# Patient Record
Sex: MALE
[De-identification: names, ages, dates, MRNs, and addresses within clinical notes are randomized per-mention and may not be internally consistent; named-entity substitution may affect disease eponyms.]

---

## 2017-01-05 ENCOUNTER — APPOINTMENT (OUTPATIENT)
Dept: GASTROENTEROLOGY | Facility: HOSPITAL | Age: 57
End: 2017-01-05

## 2017-02-14 ENCOUNTER — INPATIENT (INPATIENT)
Facility: HOSPITAL | Age: 57
LOS: 2 days | Discharge: HOME | End: 2017-02-17
Attending: HOSPITALIST | Admitting: HOSPITALIST
Payer: MEDICAID

## 2017-02-14 VITALS
DIASTOLIC BLOOD PRESSURE: 97 MMHG | HEART RATE: 124 BPM | TEMPERATURE: 99 F | SYSTOLIC BLOOD PRESSURE: 143 MMHG | WEIGHT: 164.91 LBS | OXYGEN SATURATION: 98 % | HEIGHT: 69 IN | RESPIRATION RATE: 16 BRPM

## 2017-02-14 DIAGNOSIS — Z98.89 OTHER SPECIFIED POSTPROCEDURAL STATES: Chronic | ICD-10-CM

## 2017-02-14 LAB
ALBUMIN SERPL ELPH-MCNC: 4.5 G/DL — SIGNIFICANT CHANGE UP (ref 3.3–5)
ALP SERPL-CCNC: 108 U/L — SIGNIFICANT CHANGE UP (ref 40–120)
ALT FLD-CCNC: 87 U/L — HIGH (ref 4–41)
AMYLASE P1 CFR SERPL: 133 U/L — HIGH (ref 25–125)
AST SERPL-CCNC: 84 U/L — HIGH (ref 4–40)
BASE EXCESS BLDV CALC-SCNC: 1.4 MMOL/L — SIGNIFICANT CHANGE UP
BASOPHILS # BLD AUTO: 0.03 K/UL — SIGNIFICANT CHANGE UP (ref 0–0.2)
BASOPHILS NFR BLD AUTO: 0.2 % — SIGNIFICANT CHANGE UP (ref 0–2)
BILIRUB SERPL-MCNC: 1.3 MG/DL — HIGH (ref 0.2–1.2)
BLOOD GAS VENOUS - CREATININE: 1.26 MG/DL — SIGNIFICANT CHANGE UP (ref 0.5–1.3)
BUN SERPL-MCNC: 14 MG/DL — SIGNIFICANT CHANGE UP (ref 7–23)
CALCIUM SERPL-MCNC: 9.7 MG/DL — SIGNIFICANT CHANGE UP (ref 8.4–10.5)
CHLORIDE BLDV-SCNC: 108 MMOL/L — SIGNIFICANT CHANGE UP (ref 96–108)
CHLORIDE SERPL-SCNC: 99 MMOL/L — SIGNIFICANT CHANGE UP (ref 98–107)
CO2 SERPL-SCNC: 21 MMOL/L — LOW (ref 22–31)
CREAT SERPL-MCNC: 1.23 MG/DL — SIGNIFICANT CHANGE UP (ref 0.5–1.3)
EOSINOPHIL # BLD AUTO: 0.15 K/UL — SIGNIFICANT CHANGE UP (ref 0–0.5)
EOSINOPHIL NFR BLD AUTO: 1 % — SIGNIFICANT CHANGE UP (ref 0–6)
GAS PNL BLDV: 136 MMOL/L — SIGNIFICANT CHANGE UP (ref 136–146)
GLUCOSE BLDV-MCNC: 108 — HIGH (ref 70–99)
GLUCOSE SERPL-MCNC: 110 MG/DL — HIGH (ref 70–99)
HCO3 BLDV-SCNC: 24 MMOL/L — SIGNIFICANT CHANGE UP (ref 20–27)
HCT VFR BLD CALC: 48.7 % — SIGNIFICANT CHANGE UP (ref 39–50)
HCT VFR BLDV CALC: 52.6 % — HIGH (ref 39–51)
HGB BLD-MCNC: 16.7 G/DL — SIGNIFICANT CHANGE UP (ref 13–17)
HGB BLDV-MCNC: 17.2 G/DL — HIGH (ref 13–17)
IMM GRANULOCYTES NFR BLD AUTO: 0.3 % — SIGNIFICANT CHANGE UP (ref 0–1.5)
LACTATE BLDV-MCNC: 2 MMOL/L — SIGNIFICANT CHANGE UP (ref 0.5–2)
LIDOCAIN IGE QN: 168.4 U/L — HIGH (ref 7–60)
LYMPHOCYTES # BLD AUTO: 1.25 K/UL — SIGNIFICANT CHANGE UP (ref 1–3.3)
LYMPHOCYTES # BLD AUTO: 8.7 % — LOW (ref 13–44)
MCHC RBC-ENTMCNC: 30.2 PG — SIGNIFICANT CHANGE UP (ref 27–34)
MCHC RBC-ENTMCNC: 34.3 % — SIGNIFICANT CHANGE UP (ref 32–36)
MCV RBC AUTO: 88.1 FL — SIGNIFICANT CHANGE UP (ref 80–100)
MONOCYTES # BLD AUTO: 1.12 K/UL — HIGH (ref 0–0.9)
MONOCYTES NFR BLD AUTO: 7.8 % — SIGNIFICANT CHANGE UP (ref 2–14)
NEUTROPHILS # BLD AUTO: 11.84 K/UL — HIGH (ref 1.8–7.4)
NEUTROPHILS NFR BLD AUTO: 82 % — HIGH (ref 43–77)
PCO2 BLDV: 50 MMHG — SIGNIFICANT CHANGE UP (ref 41–51)
PH BLDV: 7.34 PH — SIGNIFICANT CHANGE UP (ref 7.32–7.43)
PLATELET # BLD AUTO: 239 K/UL — SIGNIFICANT CHANGE UP (ref 150–400)
PMV BLD: 9.3 FL — SIGNIFICANT CHANGE UP (ref 7–13)
PO2 BLDV: 38 MMHG — SIGNIFICANT CHANGE UP (ref 35–40)
POTASSIUM BLDV-SCNC: 4.1 MMOL/L — SIGNIFICANT CHANGE UP (ref 3.4–4.5)
POTASSIUM SERPL-MCNC: 4.9 MMOL/L — SIGNIFICANT CHANGE UP (ref 3.5–5.3)
POTASSIUM SERPL-SCNC: 4.9 MMOL/L — SIGNIFICANT CHANGE UP (ref 3.5–5.3)
PROT SERPL-MCNC: 8 G/DL — SIGNIFICANT CHANGE UP (ref 6–8.3)
RBC # BLD: 5.53 M/UL — SIGNIFICANT CHANGE UP (ref 4.2–5.8)
RBC # FLD: 13.7 % — SIGNIFICANT CHANGE UP (ref 10.3–14.5)
SAO2 % BLDV: 66.6 % — SIGNIFICANT CHANGE UP (ref 60–85)
SODIUM SERPL-SCNC: 142 MMOL/L — SIGNIFICANT CHANGE UP (ref 135–145)
WBC # BLD: 14.44 K/UL — HIGH (ref 3.8–10.5)
WBC # FLD AUTO: 14.44 K/UL — HIGH (ref 3.8–10.5)

## 2017-02-14 PROCEDURE — 71275 CT ANGIOGRAPHY CHEST: CPT | Mod: 26

## 2017-02-14 PROCEDURE — 74177 CT ABD & PELVIS W/CONTRAST: CPT | Mod: 26

## 2017-02-14 RX ORDER — ONDANSETRON 8 MG/1
4 TABLET, FILM COATED ORAL ONCE
Qty: 0 | Refills: 0 | Status: COMPLETED | OUTPATIENT
Start: 2017-02-14 | End: 2017-02-14

## 2017-02-14 RX ORDER — SODIUM CHLORIDE 9 MG/ML
2000 INJECTION INTRAMUSCULAR; INTRAVENOUS; SUBCUTANEOUS ONCE
Qty: 0 | Refills: 0 | Status: COMPLETED | OUTPATIENT
Start: 2017-02-14 | End: 2017-02-14

## 2017-02-14 RX ORDER — HYDROMORPHONE HYDROCHLORIDE 2 MG/ML
1 INJECTION INTRAMUSCULAR; INTRAVENOUS; SUBCUTANEOUS ONCE
Qty: 0 | Refills: 0 | Status: DISCONTINUED | OUTPATIENT
Start: 2017-02-14 | End: 2017-02-14

## 2017-02-14 RX ADMIN — HYDROMORPHONE HYDROCHLORIDE 1 MILLIGRAM(S): 2 INJECTION INTRAMUSCULAR; INTRAVENOUS; SUBCUTANEOUS at 21:46

## 2017-02-14 RX ADMIN — ONDANSETRON 4 MILLIGRAM(S): 8 TABLET, FILM COATED ORAL at 21:46

## 2017-02-14 RX ADMIN — HYDROMORPHONE HYDROCHLORIDE 1 MILLIGRAM(S): 2 INJECTION INTRAMUSCULAR; INTRAVENOUS; SUBCUTANEOUS at 22:00

## 2017-02-14 RX ADMIN — SODIUM CHLORIDE 2000 MILLILITER(S): 9 INJECTION INTRAMUSCULAR; INTRAVENOUS; SUBCUTANEOUS at 21:47

## 2017-02-14 NOTE — ED ADULT NURSE NOTE - OBJECTIVE STATEMENT
received pt to room 28. pt is alert and oriented x3. c/o  generalized abdominal pain since this AM. took Motrin three times today with no relief. pt has PMH of acute pancreatitis and pancreatic surgery. vomited twice today, bilious vomitus .labs sent. 20 G SL placed  on right forearm. Md evaluation is in progress.

## 2017-02-14 NOTE — ED PROVIDER NOTE - OBJECTIVE STATEMENT
55 yo man with history of pancreatitis presenting with epigastric pain for 1 day.  Pain is sharp, burning, nonradiating, not better or worse with anything, severe in severity.  No fever, no vomit, last BM yesterday, no melena or hematochezia.  No chest pain or sob.

## 2017-02-14 NOTE — ED PROVIDER NOTE - PHYSICAL EXAMINATION
Coty att: General: Well appearing, nontoxic, no acute distress; Head: Normocephalic Atraumatic; Eyes: PERRL, EOMI; ENT: Airway patent; Neck: supple; Chest: Lungs clear to auscultation bilateral; Cardiac: Regular tachycardia, no murmurs, rubs or gallops; Abdomen: soft, epigastric tenderness, nondistended; no guarding or rebound; Musculoskeletal: Calves symmetric, nontender, no palpable cord; Skin: No rash, normal skin tone; Neuro: Alert and Oriented to person, place, and time; No focal deficit, CN 2-12 symmetric and intact

## 2017-02-14 NOTE — ED PROVIDER NOTE - PMH
Abuse, Drug or Alcohol    Pancreatitis    Pseudocyst, pancreas    Pulmonary embolism    Splenic vein thrombosis

## 2017-02-14 NOTE — ED PROVIDER NOTE - MEDICAL DECISION MAKING DETAILS
55 yo man presenting with history of PE, tachycardia and epigastric pain.  Given level of tachycardia, concern for hemorrhagic pancreatitis/necrotizing pancreatitis, PE, acute pancreatitis.  CT CAP to assess for both pending, labs, reevaluation.

## 2017-02-14 NOTE — ED PROVIDER NOTE - PROGRESS NOTE DETAILS
2016 US shows no gallstones, CBD wnl pt signed out pending results of the CT scan.  It showed pancreatitis.  Per sign out plan pt pain unable to be controlled and will be admitted for pain control.  Discussed with Dr Conti (hospitalist) and will admit to their service.  Pt's PMD is a resident at the resident medicine clinic.  ABDIAS Mcelroy paged with pt info.

## 2017-02-14 NOTE — ED ADULT TRIAGE NOTE - CHIEF COMPLAINT QUOTE
Pt comes in for generalized abd pain since this morning, pt reports that he took Motrin around 1630 and his pain is not controlled. Pt appears uncomfortable in triage, vs as noted.

## 2017-02-15 DIAGNOSIS — K85.90 ACUTE PANCREATITIS WITHOUT NECROSIS OR INFECTION, UNSPECIFIED: ICD-10-CM

## 2017-02-15 DIAGNOSIS — Z41.8 ENCOUNTER FOR OTHER PROCEDURES FOR PURPOSES OTHER THAN REMEDYING HEALTH STATE: ICD-10-CM

## 2017-02-15 DIAGNOSIS — R65.10 SYSTEMIC INFLAMMATORY RESPONSE SYNDROME (SIRS) OF NON-INFECTIOUS ORIGIN WITHOUT ACUTE ORGAN DYSFUNCTION: ICD-10-CM

## 2017-02-15 DIAGNOSIS — F19.10 OTHER PSYCHOACTIVE SUBSTANCE ABUSE, UNCOMPLICATED: ICD-10-CM

## 2017-02-15 DIAGNOSIS — R74.0 NONSPECIFIC ELEVATION OF LEVELS OF TRANSAMINASE AND LACTIC ACID DEHYDROGENASE [LDH]: ICD-10-CM

## 2017-02-15 LAB
ALBUMIN SERPL ELPH-MCNC: 4 G/DL — SIGNIFICANT CHANGE UP (ref 3.3–5)
ALP SERPL-CCNC: 93 U/L — SIGNIFICANT CHANGE UP (ref 40–120)
ALT FLD-CCNC: 64 U/L — HIGH (ref 4–41)
APPEARANCE UR: CLEAR — SIGNIFICANT CHANGE UP
AST SERPL-CCNC: 51 U/L — HIGH (ref 4–40)
BASOPHILS # BLD AUTO: 0.02 K/UL — SIGNIFICANT CHANGE UP (ref 0–0.2)
BASOPHILS NFR BLD AUTO: 0.2 % — SIGNIFICANT CHANGE UP (ref 0–2)
BILIRUB SERPL-MCNC: 1.4 MG/DL — HIGH (ref 0.2–1.2)
BILIRUB UR-MCNC: NEGATIVE — SIGNIFICANT CHANGE UP
BLOOD UR QL VISUAL: NEGATIVE — SIGNIFICANT CHANGE UP
BUN SERPL-MCNC: 11 MG/DL — SIGNIFICANT CHANGE UP (ref 7–23)
CALCIUM SERPL-MCNC: 9 MG/DL — SIGNIFICANT CHANGE UP (ref 8.4–10.5)
CHLORIDE SERPL-SCNC: 103 MMOL/L — SIGNIFICANT CHANGE UP (ref 98–107)
CO2 SERPL-SCNC: 22 MMOL/L — SIGNIFICANT CHANGE UP (ref 22–31)
COLOR SPEC: SIGNIFICANT CHANGE UP
CREAT SERPL-MCNC: 1 MG/DL — SIGNIFICANT CHANGE UP (ref 0.5–1.3)
EOSINOPHIL # BLD AUTO: 0.33 K/UL — SIGNIFICANT CHANGE UP (ref 0–0.5)
EOSINOPHIL NFR BLD AUTO: 3.5 % — SIGNIFICANT CHANGE UP (ref 0–6)
GLUCOSE SERPL-MCNC: 100 MG/DL — HIGH (ref 70–99)
GLUCOSE UR-MCNC: NEGATIVE — SIGNIFICANT CHANGE UP
HCT VFR BLD CALC: 43.2 % — SIGNIFICANT CHANGE UP (ref 39–50)
HGB BLD-MCNC: 14.5 G/DL — SIGNIFICANT CHANGE UP (ref 13–17)
IMM GRANULOCYTES NFR BLD AUTO: 0.3 % — SIGNIFICANT CHANGE UP (ref 0–1.5)
KETONES UR-MCNC: SIGNIFICANT CHANGE UP
LEUKOCYTE ESTERASE UR-ACNC: NEGATIVE — SIGNIFICANT CHANGE UP
LYMPHOCYTES # BLD AUTO: 1.47 K/UL — SIGNIFICANT CHANGE UP (ref 1–3.3)
LYMPHOCYTES # BLD AUTO: 15.7 % — SIGNIFICANT CHANGE UP (ref 13–44)
MAGNESIUM SERPL-MCNC: 2.1 MG/DL — SIGNIFICANT CHANGE UP (ref 1.6–2.6)
MCHC RBC-ENTMCNC: 30 PG — SIGNIFICANT CHANGE UP (ref 27–34)
MCHC RBC-ENTMCNC: 33.6 % — SIGNIFICANT CHANGE UP (ref 32–36)
MCV RBC AUTO: 89.3 FL — SIGNIFICANT CHANGE UP (ref 80–100)
MONOCYTES # BLD AUTO: 0.78 K/UL — SIGNIFICANT CHANGE UP (ref 0–0.9)
MONOCYTES NFR BLD AUTO: 8.3 % — SIGNIFICANT CHANGE UP (ref 2–14)
MUCOUS THREADS # UR AUTO: SIGNIFICANT CHANGE UP
NEUTROPHILS # BLD AUTO: 6.76 K/UL — SIGNIFICANT CHANGE UP (ref 1.8–7.4)
NEUTROPHILS NFR BLD AUTO: 72 % — SIGNIFICANT CHANGE UP (ref 43–77)
NITRITE UR-MCNC: NEGATIVE — SIGNIFICANT CHANGE UP
NON-SQ EPI CELLS # UR AUTO: SIGNIFICANT CHANGE UP
PH UR: 6 — SIGNIFICANT CHANGE UP (ref 4.6–8)
PHOSPHATE SERPL-MCNC: 3 MG/DL — SIGNIFICANT CHANGE UP (ref 2.5–4.5)
PLATELET # BLD AUTO: 209 K/UL — SIGNIFICANT CHANGE UP (ref 150–400)
PMV BLD: 9.5 FL — SIGNIFICANT CHANGE UP (ref 7–13)
POTASSIUM SERPL-MCNC: 3.6 MMOL/L — SIGNIFICANT CHANGE UP (ref 3.5–5.3)
POTASSIUM SERPL-SCNC: 3.6 MMOL/L — SIGNIFICANT CHANGE UP (ref 3.5–5.3)
PROT SERPL-MCNC: 6.7 G/DL — SIGNIFICANT CHANGE UP (ref 6–8.3)
PROT UR-MCNC: 20 — SIGNIFICANT CHANGE UP
RBC # BLD: 4.84 M/UL — SIGNIFICANT CHANGE UP (ref 4.2–5.8)
RBC # FLD: 13.7 % — SIGNIFICANT CHANGE UP (ref 10.3–14.5)
RBC CASTS # UR COMP ASSIST: SIGNIFICANT CHANGE UP (ref 0–?)
SODIUM SERPL-SCNC: 141 MMOL/L — SIGNIFICANT CHANGE UP (ref 135–145)
SP GR SPEC: 1.04 — HIGH (ref 1–1.03)
SQUAMOUS # UR AUTO: SIGNIFICANT CHANGE UP
UROBILINOGEN FLD QL: NORMAL E.U. — SIGNIFICANT CHANGE UP (ref 0.1–0.2)
WBC # BLD: 9.39 K/UL — SIGNIFICANT CHANGE UP (ref 3.8–10.5)
WBC # FLD AUTO: 9.39 K/UL — SIGNIFICANT CHANGE UP (ref 3.8–10.5)
WBC UR QL: SIGNIFICANT CHANGE UP (ref 0–?)

## 2017-02-15 PROCEDURE — 99254 IP/OBS CNSLTJ NEW/EST MOD 60: CPT | Mod: GC

## 2017-02-15 PROCEDURE — 99223 1ST HOSP IP/OBS HIGH 75: CPT

## 2017-02-15 RX ORDER — SODIUM CHLORIDE 9 MG/ML
1000 INJECTION INTRAMUSCULAR; INTRAVENOUS; SUBCUTANEOUS
Qty: 0 | Refills: 0 | Status: DISCONTINUED | OUTPATIENT
Start: 2017-02-15 | End: 2017-02-17

## 2017-02-15 RX ORDER — HYDROMORPHONE HYDROCHLORIDE 2 MG/ML
1 INJECTION INTRAMUSCULAR; INTRAVENOUS; SUBCUTANEOUS ONCE
Qty: 0 | Refills: 0 | Status: DISCONTINUED | OUTPATIENT
Start: 2017-02-15 | End: 2017-02-15

## 2017-02-15 RX ORDER — KETOROLAC TROMETHAMINE 30 MG/ML
30 SYRINGE (ML) INJECTION ONCE
Qty: 0 | Refills: 0 | Status: DISCONTINUED | OUTPATIENT
Start: 2017-02-15 | End: 2017-02-15

## 2017-02-15 RX ORDER — ENOXAPARIN SODIUM 100 MG/ML
40 INJECTION SUBCUTANEOUS EVERY 24 HOURS
Qty: 0 | Refills: 0 | Status: DISCONTINUED | OUTPATIENT
Start: 2017-02-15 | End: 2017-02-17

## 2017-02-15 RX ORDER — ONDANSETRON 8 MG/1
4 TABLET, FILM COATED ORAL EVERY 6 HOURS
Qty: 0 | Refills: 0 | Status: DISCONTINUED | OUTPATIENT
Start: 2017-02-15 | End: 2017-02-17

## 2017-02-15 RX ORDER — ATORVASTATIN CALCIUM 80 MG/1
10 TABLET, FILM COATED ORAL AT BEDTIME
Qty: 0 | Refills: 0 | Status: DISCONTINUED | OUTPATIENT
Start: 2017-02-15 | End: 2017-02-17

## 2017-02-15 RX ORDER — HYDROMORPHONE HYDROCHLORIDE 2 MG/ML
1 INJECTION INTRAMUSCULAR; INTRAVENOUS; SUBCUTANEOUS
Qty: 0 | Refills: 0 | Status: DISCONTINUED | OUTPATIENT
Start: 2017-02-15 | End: 2017-02-16

## 2017-02-15 RX ORDER — HYDROMORPHONE HYDROCHLORIDE 2 MG/ML
2 INJECTION INTRAMUSCULAR; INTRAVENOUS; SUBCUTANEOUS EVERY 4 HOURS
Qty: 0 | Refills: 0 | Status: DISCONTINUED | OUTPATIENT
Start: 2017-02-15 | End: 2017-02-16

## 2017-02-15 RX ADMIN — HYDROMORPHONE HYDROCHLORIDE 1 MILLIGRAM(S): 2 INJECTION INTRAMUSCULAR; INTRAVENOUS; SUBCUTANEOUS at 21:50

## 2017-02-15 RX ADMIN — HYDROMORPHONE HYDROCHLORIDE 1 MILLIGRAM(S): 2 INJECTION INTRAMUSCULAR; INTRAVENOUS; SUBCUTANEOUS at 03:14

## 2017-02-15 RX ADMIN — HYDROMORPHONE HYDROCHLORIDE 1 MILLIGRAM(S): 2 INJECTION INTRAMUSCULAR; INTRAVENOUS; SUBCUTANEOUS at 13:52

## 2017-02-15 RX ADMIN — HYDROMORPHONE HYDROCHLORIDE 1 MILLIGRAM(S): 2 INJECTION INTRAMUSCULAR; INTRAVENOUS; SUBCUTANEOUS at 08:49

## 2017-02-15 RX ADMIN — ATORVASTATIN CALCIUM 10 MILLIGRAM(S): 80 TABLET, FILM COATED ORAL at 22:58

## 2017-02-15 RX ADMIN — HYDROMORPHONE HYDROCHLORIDE 1 MILLIGRAM(S): 2 INJECTION INTRAMUSCULAR; INTRAVENOUS; SUBCUTANEOUS at 14:26

## 2017-02-15 RX ADMIN — HYDROMORPHONE HYDROCHLORIDE 1 MILLIGRAM(S): 2 INJECTION INTRAMUSCULAR; INTRAVENOUS; SUBCUTANEOUS at 00:54

## 2017-02-15 RX ADMIN — Medication 30 MILLIGRAM(S): at 03:23

## 2017-02-15 RX ADMIN — HYDROMORPHONE HYDROCHLORIDE 1 MILLIGRAM(S): 2 INJECTION INTRAMUSCULAR; INTRAVENOUS; SUBCUTANEOUS at 09:09

## 2017-02-15 RX ADMIN — ENOXAPARIN SODIUM 40 MILLIGRAM(S): 100 INJECTION SUBCUTANEOUS at 06:57

## 2017-02-15 RX ADMIN — HYDROMORPHONE HYDROCHLORIDE 1 MILLIGRAM(S): 2 INJECTION INTRAMUSCULAR; INTRAVENOUS; SUBCUTANEOUS at 22:58

## 2017-02-15 RX ADMIN — Medication 30 MILLIGRAM(S): at 02:53

## 2017-02-15 RX ADMIN — HYDROMORPHONE HYDROCHLORIDE 1 MILLIGRAM(S): 2 INJECTION INTRAMUSCULAR; INTRAVENOUS; SUBCUTANEOUS at 00:24

## 2017-02-15 RX ADMIN — HYDROMORPHONE HYDROCHLORIDE 1 MILLIGRAM(S): 2 INJECTION INTRAMUSCULAR; INTRAVENOUS; SUBCUTANEOUS at 02:44

## 2017-02-15 RX ADMIN — HYDROMORPHONE HYDROCHLORIDE 1 MILLIGRAM(S): 2 INJECTION INTRAMUSCULAR; INTRAVENOUS; SUBCUTANEOUS at 17:59

## 2017-02-15 RX ADMIN — HYDROMORPHONE HYDROCHLORIDE 1 MILLIGRAM(S): 2 INJECTION INTRAMUSCULAR; INTRAVENOUS; SUBCUTANEOUS at 18:26

## 2017-02-15 RX ADMIN — SODIUM CHLORIDE 100 MILLILITER(S): 9 INJECTION INTRAMUSCULAR; INTRAVENOUS; SUBCUTANEOUS at 05:26

## 2017-02-15 NOTE — H&P ADULT. - PROBLEM SELECTOR PLAN 1
Due to pancreatitis; Non toxic appearing; Low suspicion of infection;  -Monitor for fever  -Treat pancreatitis  -Monitor WBC  -BCx if spikes fever Due to pancreatitis; Non toxic appearing; Low suspicion of infection;  -Monitor for fever  -Treat pancreatitis  -Monitor WBC  -BCx if spikes fever  -check Lipid panel ([id number] in 12/2016)

## 2017-02-15 NOTE — H&P ADULT. - PROBLEM SELECTOR PLAN 3
Prior h/o of elevated bilirubin and AST/ALT;  Triggered MRCP in 6/19/2017 which was c/w pancreatitis and no choledocholithiasis  or  biliary  ductal  dilatation.  Monitor Prior h/o of elevated bilirubin and AST/ALT;  -Acute Hep panel negative in 12/2015 and 10/2015;  -Triggered MRCP in 6/19/2017 which was c/w pancreatitis and no evidence of choledocholithiasis  or  biliary  ductal  dilatation.  -Monitor

## 2017-02-15 NOTE — H&P ADULT. - HISTORY OF PRESENT ILLNESS
57 yo male with history of pancreatitis and pseudocyst, pulmonary embolism, splenic vein thrombosis, Lap cystgastrostomy in 2012, c/o epigastric pain for 1 day.  Pain is sharp, burning, non-radiating, severe in severity, no aggravating or alleviating factors. Reports no fever, vomiting, last BM yesterday, no melena or hematochezia.  No chest pain or sob.    ED course: 56 year old male with history of EtOH abuse, alcoholic pancreatitis with pseudocyst formation/pancreatic necrosis s/p lap cystgastrostomy drainage 2012, PE in 2/2016, h/o splenic vein thrombosis formerly on coumadin but no longer present on CT in 2016 c/o epigastric pain for 1 day.  Pain is sharp, burning, non-radiating, severe in severity, no aggravating or alleviating factors. Reports no fever, vomiting, last BM yesterday, no melena or hematochezia.  No chest pain or sob.    ED course: 2L NS bolus, Dilaudid IVP; 56 year old male with history of EtOH abuse, alcoholic pancreatitis (multiple episodes) with pseudocyst formation/pancreatic necrosis s/p lap cystgastrostomy drainage 2012, PE in 2/2016, h/o splenic vein thrombosis formerly on coumadin but no longer present on CT in 2016 c/o epigastric pain for 1 day. Pain is sharp, burning, non-radiating, severe in severity, no aggravating or alleviating factors, c/w prior pancreatitis episodes per the pt. Reports no fever, vomiting, last BM yesterday, no melena or hematochezia.  No chest pain or sob. States that last EtOH intake was in 9/2016, no h/o binge eating or recent fried food intake;     ED course: 2L NS bolus, Dilaudid IVP;

## 2017-02-15 NOTE — H&P ADULT. - RADIOLOGY RESULTS AND INTERPRETATION
CT A/P, CTA chest: No  pulmonary  embolism. No  focal  pulmonary  opacity  or  pleural  effusion. 2.  Acute  interstitial  edematous  pancreatitis.  No  focal  hemorrhage  or necrosis. No  peripancreatic  fluid  collections.

## 2017-02-15 NOTE — H&P ADULT. - ASSESSMENT
56 year old male with history of EtOH abuse, alcoholic pancreatitis (multiple episodes) with pseudocyst formation/pancreatic necrosis s/p lap cystgastrostomy drainage 2012, PE in 2/2016, h/o splenic vein thrombosis (formerly on coumadin) a/w SIRS due to acute on chronic pancreatitis; Mild elevation in bili and AST/ALT - prior recent MRIs Abd c/w no evidence of biliary process/obstruction;

## 2017-02-15 NOTE — H&P ADULT. - MUSCULOSKELETAL
details… detailed exam ROM intact/no joint swelling/no calf tenderness/no joint warmth/normal strength/no joint erythema

## 2017-02-15 NOTE — H&P ADULT. - LYMPHATIC
anterior cervical R/posterior cervical L/supraclavicular L/anterior cervical L/posterior cervical R/supraclavicular R

## 2017-02-15 NOTE — H&P ADULT. - PROBLEM SELECTOR PLAN 2
Unclear triggering factor; MRI of Abd in 2/2016 and 6/2016 not c/w evidence of gallstones  or  choledocholithiasis;  -Hold off with MRI  -GI evaluation in AM (GI clinic patient)  -IV NS hydration  -Pain control  -Nausea control

## 2017-02-16 ENCOUNTER — TRANSCRIPTION ENCOUNTER (OUTPATIENT)
Age: 57
End: 2017-02-16

## 2017-02-16 LAB
ALBUMIN SERPL ELPH-MCNC: 3.7 G/DL — SIGNIFICANT CHANGE UP (ref 3.3–5)
ALP SERPL-CCNC: 100 U/L — SIGNIFICANT CHANGE UP (ref 40–120)
ALT FLD-CCNC: 57 U/L — HIGH (ref 4–41)
AST SERPL-CCNC: 50 U/L — HIGH (ref 4–40)
BILIRUB SERPL-MCNC: 1.4 MG/DL — HIGH (ref 0.2–1.2)
BUN SERPL-MCNC: 4 MG/DL — LOW (ref 7–23)
CALCIUM SERPL-MCNC: 9.3 MG/DL — SIGNIFICANT CHANGE UP (ref 8.4–10.5)
CHLORIDE SERPL-SCNC: 101 MMOL/L — SIGNIFICANT CHANGE UP (ref 98–107)
CO2 SERPL-SCNC: 26 MMOL/L — SIGNIFICANT CHANGE UP (ref 22–31)
CREAT SERPL-MCNC: 0.94 MG/DL — SIGNIFICANT CHANGE UP (ref 0.5–1.3)
GLUCOSE SERPL-MCNC: 104 MG/DL — HIGH (ref 70–99)
POTASSIUM SERPL-MCNC: 3.9 MMOL/L — SIGNIFICANT CHANGE UP (ref 3.5–5.3)
POTASSIUM SERPL-SCNC: 3.9 MMOL/L — SIGNIFICANT CHANGE UP (ref 3.5–5.3)
PROT SERPL-MCNC: 6.9 G/DL — SIGNIFICANT CHANGE UP (ref 6–8.3)
SODIUM SERPL-SCNC: 142 MMOL/L — SIGNIFICANT CHANGE UP (ref 135–145)

## 2017-02-16 PROCEDURE — 76705 ECHO EXAM OF ABDOMEN: CPT | Mod: 26

## 2017-02-16 PROCEDURE — 99232 SBSQ HOSP IP/OBS MODERATE 35: CPT | Mod: GC

## 2017-02-16 PROCEDURE — 99233 SBSQ HOSP IP/OBS HIGH 50: CPT

## 2017-02-16 RX ORDER — IBUPROFEN 200 MG
400 TABLET ORAL ONCE
Qty: 0 | Refills: 0 | Status: COMPLETED | OUTPATIENT
Start: 2017-02-16 | End: 2017-02-16

## 2017-02-16 RX ADMIN — ATORVASTATIN CALCIUM 10 MILLIGRAM(S): 80 TABLET, FILM COATED ORAL at 21:09

## 2017-02-16 RX ADMIN — HYDROMORPHONE HYDROCHLORIDE 2 MILLIGRAM(S): 2 INJECTION INTRAMUSCULAR; INTRAVENOUS; SUBCUTANEOUS at 09:00

## 2017-02-16 RX ADMIN — ENOXAPARIN SODIUM 40 MILLIGRAM(S): 100 INJECTION SUBCUTANEOUS at 06:17

## 2017-02-16 RX ADMIN — HYDROMORPHONE HYDROCHLORIDE 1 MILLIGRAM(S): 2 INJECTION INTRAMUSCULAR; INTRAVENOUS; SUBCUTANEOUS at 06:21

## 2017-02-16 RX ADMIN — Medication 400 MILLIGRAM(S): at 17:45

## 2017-02-16 RX ADMIN — Medication 400 MILLIGRAM(S): at 16:52

## 2017-02-16 RX ADMIN — HYDROMORPHONE HYDROCHLORIDE 2 MILLIGRAM(S): 2 INJECTION INTRAMUSCULAR; INTRAVENOUS; SUBCUTANEOUS at 08:44

## 2017-02-16 RX ADMIN — Medication 400 MILLIGRAM(S): at 23:26

## 2017-02-16 RX ADMIN — HYDROMORPHONE HYDROCHLORIDE 1 MILLIGRAM(S): 2 INJECTION INTRAMUSCULAR; INTRAVENOUS; SUBCUTANEOUS at 05:17

## 2017-02-16 NOTE — DISCHARGE NOTE ADULT - PATIENT PORTAL LINK FT
“You can access the [facility] Patient Portal, offered by [facility], by registering with the following website: [email or web address]”

## 2017-02-16 NOTE — DISCHARGE NOTE ADULT - PLAN OF CARE
To be pain free Pain resolved.  Abdominal ultrasound negative for gallstones.  Follow up with Gastroenterology.

## 2017-02-16 NOTE — DISCHARGE NOTE ADULT - HOSPITAL COURSE
56 year old male with history of EtOH abuse, alcoholic pancreatitis (multiple episodes) with pseudocyst formation/pancreatic necrosis s/p lap cystgastrostomy drainage 2012, PE in 2/2016, h/o splenic vein thrombosis (formerly on coumadin) a/w SIRS due to acute on chronic pancreatitis.  Noted with mild elevation in bili and AST/ALT - prior recent MRIs Abd c/w no evidence of biliary process/obstruction.  Non toxic appearing; Low suspicion of infection.  Remained afebrile.  Evaluated by house GI as patient is known to the GI clinic.  Received IV NS hydration and pain control.  -CT abd with acute pancreatitis  -Abd US negative for gallstones. Diet advanced and was able to tolerate.  Medically cleared for d/c home with follow up to GI. 56 year old male with history of EtOH abuse, alcoholic pancreatitis (multiple episodes) with pseudocyst formation/pancreatic necrosis s/p lap cystgastrostomy drainage 2012, PE in 2/2016, h/o splenic vein thrombosis (formerly on coumadin) a/w SIRS due to acute on chronic pancreatitis.  Noted with mild elevation in bili and AST/ALT - prior recent MRIs Abd c/w no evidence of biliary process/obstruction.  Non toxic appearing; Low suspicion of infection.  Remained afebrile.  Evaluated by house GI as patient is known to the GI clinic.  Received IV NS hydration and pain control.  -CT abd with acute pancreatitis, pt's symptoms improved. Abd US negative for gallstones. Diet advanced and was able to tolerate.  Medically cleared for d/c home with follow up to GI.

## 2017-02-16 NOTE — DISCHARGE NOTE ADULT - MEDICATION SUMMARY - MEDICATIONS TO TAKE
I will START or STAY ON the medications listed below when I get home from the hospital:    lovastatin 40 mg oral tablet  -- 1 tab(s) by mouth once a day  -- Indication: For cholesterol

## 2017-02-16 NOTE — DISCHARGE NOTE ADULT - CARE PLAN
Principal Discharge DX:	Acute pancreatitis, unspecified complication status, unspecified pancreatitis type  Goal:	To be pain free  Instructions for follow-up, activity and diet:	Pain resolved.  Abdominal ultrasound negative for gallstones.  Follow up with Gastroenterology.

## 2017-02-17 VITALS
OXYGEN SATURATION: 99 % | RESPIRATION RATE: 18 BRPM | TEMPERATURE: 98 F | DIASTOLIC BLOOD PRESSURE: 85 MMHG | SYSTOLIC BLOOD PRESSURE: 127 MMHG | HEART RATE: 83 BPM

## 2017-02-17 PROCEDURE — 99239 HOSP IP/OBS DSCHRG MGMT >30: CPT

## 2017-02-17 RX ADMIN — ENOXAPARIN SODIUM 40 MILLIGRAM(S): 100 INJECTION SUBCUTANEOUS at 06:20

## 2017-02-17 RX ADMIN — Medication 400 MILLIGRAM(S): at 00:00

## 2017-03-09 ENCOUNTER — OUTPATIENT (OUTPATIENT)
Dept: OUTPATIENT SERVICES | Facility: HOSPITAL | Age: 57
LOS: 1 days | End: 2017-03-09

## 2017-03-09 ENCOUNTER — LABORATORY RESULT (OUTPATIENT)
Age: 57
End: 2017-03-09

## 2017-03-09 ENCOUNTER — APPOINTMENT (OUTPATIENT)
Dept: GASTROENTEROLOGY | Facility: HOSPITAL | Age: 57
End: 2017-03-09

## 2017-03-09 VITALS
HEART RATE: 99 BPM | DIASTOLIC BLOOD PRESSURE: 81 MMHG | HEIGHT: 68 IN | WEIGHT: 167 LBS | SYSTOLIC BLOOD PRESSURE: 139 MMHG | BODY MASS INDEX: 25.31 KG/M2

## 2017-03-09 DIAGNOSIS — Z98.89 OTHER SPECIFIED POSTPROCEDURAL STATES: Chronic | ICD-10-CM

## 2017-03-09 DIAGNOSIS — K86.0 ALCOHOL-INDUCED CHRONIC PANCREATITIS: ICD-10-CM

## 2017-03-09 DIAGNOSIS — K85.90 ACUTE PANCREATITIS WITHOUT NECROSIS OR INFECTION, UNSPECIFIED: ICD-10-CM

## 2017-03-09 DIAGNOSIS — R74.8 ABNORMAL LEVELS OF OTHER SERUM ENZYMES: ICD-10-CM

## 2017-03-09 LAB
ALBUMIN SERPL ELPH-MCNC: 4.7 G/DL — SIGNIFICANT CHANGE UP (ref 3.3–5)
ALP SERPL-CCNC: 102 U/L — SIGNIFICANT CHANGE UP (ref 40–120)
ALT FLD-CCNC: 71 U/L — HIGH (ref 4–41)
AST SERPL-CCNC: 55 U/L — HIGH (ref 4–40)
BILIRUB DIRECT SERPL-MCNC: 0.2 MG/DL — SIGNIFICANT CHANGE UP (ref 0.1–0.2)
BILIRUB SERPL-MCNC: 0.7 MG/DL — SIGNIFICANT CHANGE UP (ref 0.2–1.2)
BUN SERPL-MCNC: 12 MG/DL — SIGNIFICANT CHANGE UP (ref 7–23)
CALCIUM SERPL-MCNC: 10 MG/DL — SIGNIFICANT CHANGE UP (ref 8.4–10.5)
CHLORIDE SERPL-SCNC: 100 MMOL/L — SIGNIFICANT CHANGE UP (ref 98–107)
CO2 SERPL-SCNC: 24 MMOL/L — SIGNIFICANT CHANGE UP (ref 22–31)
CREAT SERPL-MCNC: 1.14 MG/DL — SIGNIFICANT CHANGE UP (ref 0.5–1.3)
GLUCOSE SERPL-MCNC: 103 MG/DL — HIGH (ref 70–99)
IGA FLD-MCNC: 275 MG/DL — SIGNIFICANT CHANGE UP (ref 70–400)
IGG FLD-MCNC: 797 MG/DL — SIGNIFICANT CHANGE UP (ref 700–1600)
IGM SERPL-MCNC: 65 MG/DL — SIGNIFICANT CHANGE UP (ref 40–230)
POTASSIUM SERPL-MCNC: 4 MMOL/L — SIGNIFICANT CHANGE UP (ref 3.5–5.3)
POTASSIUM SERPL-SCNC: 4 MMOL/L — SIGNIFICANT CHANGE UP (ref 3.5–5.3)
PROT SERPL-MCNC: 7.7 G/DL — SIGNIFICANT CHANGE UP (ref 6–8.3)
SODIUM SERPL-SCNC: 142 MMOL/L — SIGNIFICANT CHANGE UP (ref 135–145)

## 2017-03-29 ENCOUNTER — OUTPATIENT (OUTPATIENT)
Dept: OUTPATIENT SERVICES | Facility: HOSPITAL | Age: 57
LOS: 1 days | End: 2017-03-29

## 2017-03-29 ENCOUNTER — APPOINTMENT (OUTPATIENT)
Dept: INTERNAL MEDICINE | Facility: HOSPITAL | Age: 57
End: 2017-03-29

## 2017-03-29 VITALS
HEIGHT: 69 IN | DIASTOLIC BLOOD PRESSURE: 89 MMHG | BODY MASS INDEX: 24.88 KG/M2 | SYSTOLIC BLOOD PRESSURE: 130 MMHG | HEART RATE: 98 BPM | WEIGHT: 168 LBS | RESPIRATION RATE: 16 BRPM

## 2017-03-29 DIAGNOSIS — Z87.898 PERSONAL HISTORY OF OTHER SPECIFIED CONDITIONS: ICD-10-CM

## 2017-03-29 DIAGNOSIS — Z98.89 OTHER SPECIFIED POSTPROCEDURAL STATES: Chronic | ICD-10-CM

## 2017-03-31 DIAGNOSIS — K86.0 ALCOHOL-INDUCED CHRONIC PANCREATITIS: ICD-10-CM

## 2017-03-31 DIAGNOSIS — F10.230 ALCOHOL DEPENDENCE WITH WITHDRAWAL, UNCOMPLICATED: ICD-10-CM

## 2017-03-31 DIAGNOSIS — Z87.898 PERSONAL HISTORY OF OTHER SPECIFIED CONDITIONS: ICD-10-CM

## 2017-07-10 ENCOUNTER — APPOINTMENT (OUTPATIENT)
Dept: INTERNAL MEDICINE | Facility: HOSPITAL | Age: 57
End: 2017-07-10

## 2017-07-10 ENCOUNTER — LABORATORY RESULT (OUTPATIENT)
Age: 57
End: 2017-07-10

## 2017-07-10 ENCOUNTER — OUTPATIENT (OUTPATIENT)
Dept: OUTPATIENT SERVICES | Facility: HOSPITAL | Age: 57
LOS: 1 days | End: 2017-07-10

## 2017-07-10 VITALS
HEART RATE: 112 BPM | RESPIRATION RATE: 14 BRPM | OXYGEN SATURATION: 98 % | SYSTOLIC BLOOD PRESSURE: 138 MMHG | DIASTOLIC BLOOD PRESSURE: 89 MMHG

## 2017-07-10 VITALS — WEIGHT: 175 LBS | HEIGHT: 69 IN | BODY MASS INDEX: 25.92 KG/M2

## 2017-07-10 DIAGNOSIS — R00.0 TACHYCARDIA, UNSPECIFIED: ICD-10-CM

## 2017-07-10 DIAGNOSIS — Z12.11 ENCOUNTER FOR SCREENING FOR MALIGNANT NEOPLASM OF COLON: ICD-10-CM

## 2017-07-10 DIAGNOSIS — Z98.89 OTHER SPECIFIED POSTPROCEDURAL STATES: Chronic | ICD-10-CM

## 2017-07-10 DIAGNOSIS — Z87.19 PERSONAL HISTORY OF OTHER DISEASES OF THE DIGESTIVE SYSTEM: ICD-10-CM

## 2017-07-10 LAB
ALBUMIN SERPL ELPH-MCNC: 4.8 G/DL — SIGNIFICANT CHANGE UP (ref 3.3–5)
ALP SERPL-CCNC: 104 U/L — SIGNIFICANT CHANGE UP (ref 40–120)
ALT FLD-CCNC: 60 U/L — HIGH (ref 4–41)
AST SERPL-CCNC: 45 U/L — HIGH (ref 4–40)
BILIRUB SERPL-MCNC: 0.6 MG/DL — SIGNIFICANT CHANGE UP (ref 0.2–1.2)
BUN SERPL-MCNC: 15 MG/DL — SIGNIFICANT CHANGE UP (ref 7–23)
CALCIUM SERPL-MCNC: 10.2 MG/DL — SIGNIFICANT CHANGE UP (ref 8.4–10.5)
CHLORIDE SERPL-SCNC: 97 MMOL/L — LOW (ref 98–107)
CO2 SERPL-SCNC: 27 MMOL/L — SIGNIFICANT CHANGE UP (ref 22–31)
CREAT SERPL-MCNC: 1.15 MG/DL — SIGNIFICANT CHANGE UP (ref 0.5–1.3)
D DIMER BLD IA.RAPID-MCNC: < 150 NG/ML — SIGNIFICANT CHANGE UP
GLUCOSE SERPL-MCNC: 87 MG/DL — SIGNIFICANT CHANGE UP (ref 70–99)
HBA1C BLD-MCNC: 6.3 % — HIGH (ref 4–5.6)
POTASSIUM SERPL-MCNC: 4 MMOL/L — SIGNIFICANT CHANGE UP (ref 3.5–5.3)
POTASSIUM SERPL-SCNC: 4 MMOL/L — SIGNIFICANT CHANGE UP (ref 3.5–5.3)
PROT SERPL-MCNC: 7.8 G/DL — SIGNIFICANT CHANGE UP (ref 6–8.3)
SODIUM SERPL-SCNC: 142 MMOL/L — SIGNIFICANT CHANGE UP (ref 135–145)

## 2017-07-11 DIAGNOSIS — R74.8 ABNORMAL LEVELS OF OTHER SERUM ENZYMES: ICD-10-CM

## 2017-07-11 DIAGNOSIS — F10.230 ALCOHOL DEPENDENCE WITH WITHDRAWAL, UNCOMPLICATED: ICD-10-CM

## 2017-07-11 DIAGNOSIS — Z00.00 ENCOUNTER FOR GENERAL ADULT MEDICAL EXAMINATION WITHOUT ABNORMAL FINDINGS: ICD-10-CM

## 2017-07-11 DIAGNOSIS — R73.03 PREDIABETES: ICD-10-CM

## 2017-07-11 DIAGNOSIS — E78.5 HYPERLIPIDEMIA, UNSPECIFIED: ICD-10-CM

## 2017-07-11 DIAGNOSIS — R00.0 TACHYCARDIA, UNSPECIFIED: ICD-10-CM

## 2017-07-11 DIAGNOSIS — K86.0 ALCOHOL-INDUCED CHRONIC PANCREATITIS: ICD-10-CM

## 2017-07-20 ENCOUNTER — INPATIENT (INPATIENT)
Facility: HOSPITAL | Age: 57
LOS: 6 days | Discharge: HOME | End: 2017-07-27
Attending: HOSPITALIST | Admitting: HOSPITALIST
Payer: MEDICAID

## 2017-07-20 VITALS
SYSTOLIC BLOOD PRESSURE: 141 MMHG | OXYGEN SATURATION: 100 % | HEART RATE: 116 BPM | RESPIRATION RATE: 18 BRPM | TEMPERATURE: 98 F | DIASTOLIC BLOOD PRESSURE: 98 MMHG

## 2017-07-20 DIAGNOSIS — Z98.89 OTHER SPECIFIED POSTPROCEDURAL STATES: Chronic | ICD-10-CM

## 2017-07-20 LAB
ALBUMIN SERPL ELPH-MCNC: 4.4 G/DL — SIGNIFICANT CHANGE UP (ref 3.3–5)
ALP SERPL-CCNC: 98 U/L — SIGNIFICANT CHANGE UP (ref 40–120)
ALT FLD-CCNC: 90 U/L — HIGH (ref 4–41)
AMYLASE P1 CFR SERPL: 149 U/L — HIGH (ref 25–125)
APAP SERPL-MCNC: < 15 UG/ML — LOW (ref 15–25)
AST SERPL-CCNC: 75 U/L — HIGH (ref 4–40)
BARBITURATES MEASUREMENT: NEGATIVE — SIGNIFICANT CHANGE UP
BASE EXCESS BLDV CALC-SCNC: -2 MMOL/L — SIGNIFICANT CHANGE UP
BASOPHILS # BLD AUTO: 0.05 K/UL — SIGNIFICANT CHANGE UP (ref 0–0.2)
BASOPHILS NFR BLD AUTO: 0.3 % — SIGNIFICANT CHANGE UP (ref 0–2)
BENZODIAZ SERPL-MCNC: NEGATIVE — SIGNIFICANT CHANGE UP
BILIRUB SERPL-MCNC: 1.5 MG/DL — HIGH (ref 0.2–1.2)
BLOOD GAS VENOUS - CREATININE: 1.17 MG/DL — SIGNIFICANT CHANGE UP (ref 0.5–1.3)
BUN SERPL-MCNC: 13 MG/DL — SIGNIFICANT CHANGE UP (ref 7–23)
CALCIUM SERPL-MCNC: 9.8 MG/DL — SIGNIFICANT CHANGE UP (ref 8.4–10.5)
CHLORIDE BLDV-SCNC: 110 MMOL/L — HIGH (ref 96–108)
CHLORIDE SERPL-SCNC: 104 MMOL/L — SIGNIFICANT CHANGE UP (ref 98–107)
CO2 SERPL-SCNC: 21 MMOL/L — LOW (ref 22–31)
CREAT SERPL-MCNC: 1.19 MG/DL — SIGNIFICANT CHANGE UP (ref 0.5–1.3)
EOSINOPHIL # BLD AUTO: 0.04 K/UL — SIGNIFICANT CHANGE UP (ref 0–0.5)
EOSINOPHIL NFR BLD AUTO: 0.3 % — SIGNIFICANT CHANGE UP (ref 0–6)
ETHANOL BLD-MCNC: < 10 MG/DL — SIGNIFICANT CHANGE UP
GAS PNL BLDV: 144 MMOL/L — SIGNIFICANT CHANGE UP (ref 136–146)
GLUCOSE BLDV-MCNC: 112 — HIGH (ref 70–99)
GLUCOSE SERPL-MCNC: 105 MG/DL — HIGH (ref 70–99)
HCO3 BLDV-SCNC: 23 MMOL/L — SIGNIFICANT CHANGE UP (ref 20–27)
HCT VFR BLD CALC: 47.2 % — SIGNIFICANT CHANGE UP (ref 39–50)
HCT VFR BLDV CALC: 47.2 % — SIGNIFICANT CHANGE UP (ref 39–51)
HGB BLD-MCNC: 16 G/DL — SIGNIFICANT CHANGE UP (ref 13–17)
HGB BLDV-MCNC: 15.4 G/DL — SIGNIFICANT CHANGE UP (ref 13–17)
IMM GRANULOCYTES # BLD AUTO: 0.05 # — SIGNIFICANT CHANGE UP
IMM GRANULOCYTES NFR BLD AUTO: 0.3 % — SIGNIFICANT CHANGE UP (ref 0–1.5)
LACTATE BLDV-MCNC: 3.2 MMOL/L — HIGH (ref 0.5–2)
LIDOCAIN IGE QN: 242.8 U/L — HIGH (ref 7–60)
LYMPHOCYTES # BLD AUTO: 1.3 K/UL — SIGNIFICANT CHANGE UP (ref 1–3.3)
LYMPHOCYTES # BLD AUTO: 9.1 % — LOW (ref 13–44)
MCHC RBC-ENTMCNC: 28.7 PG — SIGNIFICANT CHANGE UP (ref 27–34)
MCHC RBC-ENTMCNC: 33.9 % — SIGNIFICANT CHANGE UP (ref 32–36)
MCV RBC AUTO: 84.6 FL — SIGNIFICANT CHANGE UP (ref 80–100)
MONOCYTES # BLD AUTO: 0.98 K/UL — HIGH (ref 0–0.9)
MONOCYTES NFR BLD AUTO: 6.8 % — SIGNIFICANT CHANGE UP (ref 2–14)
NEUTROPHILS # BLD AUTO: 11.93 K/UL — HIGH (ref 1.8–7.4)
NEUTROPHILS NFR BLD AUTO: 83.2 % — HIGH (ref 43–77)
NRBC # FLD: 0 — SIGNIFICANT CHANGE UP
PCO2 BLDV: 32 MMHG — LOW (ref 41–51)
PH BLDV: 7.44 PH — HIGH (ref 7.32–7.43)
PLATELET # BLD AUTO: 256 K/UL — SIGNIFICANT CHANGE UP (ref 150–400)
PMV BLD: 9.6 FL — SIGNIFICANT CHANGE UP (ref 7–13)
PO2 BLDV: 89 MMHG — HIGH (ref 35–40)
POTASSIUM BLDV-SCNC: 4.1 MMOL/L — SIGNIFICANT CHANGE UP (ref 3.4–4.5)
POTASSIUM SERPL-MCNC: 3.9 MMOL/L — SIGNIFICANT CHANGE UP (ref 3.5–5.3)
POTASSIUM SERPL-SCNC: 3.9 MMOL/L — SIGNIFICANT CHANGE UP (ref 3.5–5.3)
PROT SERPL-MCNC: 7.5 G/DL — SIGNIFICANT CHANGE UP (ref 6–8.3)
RBC # BLD: 5.58 M/UL — SIGNIFICANT CHANGE UP (ref 4.2–5.8)
RBC # FLD: 13.2 % — SIGNIFICANT CHANGE UP (ref 10.3–14.5)
SALICYLATES SERPL-MCNC: < 5 MG/DL — LOW (ref 15–30)
SAO2 % BLDV: 97.8 % — HIGH (ref 60–85)
SODIUM SERPL-SCNC: 144 MMOL/L — SIGNIFICANT CHANGE UP (ref 135–145)
WBC # BLD: 14.35 K/UL — HIGH (ref 3.8–10.5)
WBC # FLD AUTO: 14.35 K/UL — HIGH (ref 3.8–10.5)

## 2017-07-20 RX ORDER — ONDANSETRON 8 MG/1
4 TABLET, FILM COATED ORAL ONCE
Qty: 0 | Refills: 0 | Status: COMPLETED | OUTPATIENT
Start: 2017-07-20 | End: 2017-07-20

## 2017-07-20 RX ORDER — SODIUM CHLORIDE 9 MG/ML
2000 INJECTION INTRAMUSCULAR; INTRAVENOUS; SUBCUTANEOUS ONCE
Qty: 0 | Refills: 0 | Status: COMPLETED | OUTPATIENT
Start: 2017-07-20 | End: 2017-07-20

## 2017-07-20 RX ADMIN — ONDANSETRON 4 MILLIGRAM(S): 8 TABLET, FILM COATED ORAL at 21:57

## 2017-07-20 RX ADMIN — HYDROMORPHONE HYDROCHLORIDE 1 MILLIGRAM(S): 2 INJECTION INTRAMUSCULAR; INTRAVENOUS; SUBCUTANEOUS at 22:45

## 2017-07-20 RX ADMIN — HYDROMORPHONE HYDROCHLORIDE 1 MILLIGRAM(S): 2 INJECTION INTRAMUSCULAR; INTRAVENOUS; SUBCUTANEOUS at 22:30

## 2017-07-20 RX ADMIN — SODIUM CHLORIDE 2000 MILLILITER(S): 9 INJECTION INTRAMUSCULAR; INTRAVENOUS; SUBCUTANEOUS at 21:56

## 2017-07-20 NOTE — ED ADULT NURSE NOTE - OBJECTIVE STATEMENT
Pt arrives to ED rm 21 c/o a flare-up of his pancreatitis.  Pt reports he has not drank alcohol since March.  Left hand 20g iv place by KIMBERLY WHITE, labs drawn and sent via butterfly needle.  Attending Dexeus at bedside assessing pt.  Pt medicated as per EMAR.

## 2017-07-20 NOTE — ED PROVIDER NOTE - PROGRESS NOTE DETAILS
Richar Morgan MD PGY4: Labs, imaging reviewed. Received IVF, pain control. Remained unable to tolerate PO. Case d/w Dr. Becerra, accepted to service. MAR signout @ 2310.

## 2017-07-20 NOTE — ED PROVIDER NOTE - ATTENDING CONTRIBUTION TO CARE
57M p/w abd pain periumbilical, epigastric, rad to back, x 1 day, constant.  Similar to prev episodes of pancreatitis.  A/w 2 episodes of vomiting.  Pt reports has been sober since february, his last admission for similar.    VS:  unremarkable except tachycardia    GEN - mild-mod distress abd pain; A+O x3   HEAD - NC/AT     ENT - PEERL, EOMI, mucous membranes dry , no discharge      NECK: Neck supple, non-tender without lymphadenopathy, no masses, no JVD  PULM - CTA b/l,  symmetric breath sounds  COR -  normal heart sounds    ABD - , mild distension, periumbilical and epigastric ttp, soft, no guarding, no rebound, no masses    BACK - no CVA tenderness, nontender spine     EXTREMS - no edema, no deformity, warm and well perfused    SKIN - no rash or bruising      NEUROLOGIC - alert, CN 2-12 intact, sensation nl, motor 5/5 RUE/LUE/RLE/LLE.      IMP:  57M p/w likely recurrent pancreatitis.  Rx fluids, check labs, CT given degree of tenderness, rx pain meds, admit.

## 2017-07-20 NOTE — ED ADULT TRIAGE NOTE - CHIEF COMPLAINT QUOTE
states "my pancreatitis is acting up again." states hasn't had a flare-up since February. hasn't drank alcohol since March. also having nausea/vomiting. denies any chest pain, sob. states always tachycardic, had EKG done this week, was sinus tach. appears comfortable in triage.

## 2017-07-21 DIAGNOSIS — R74.0 NONSPECIFIC ELEVATION OF LEVELS OF TRANSAMINASE AND LACTIC ACID DEHYDROGENASE [LDH]: ICD-10-CM

## 2017-07-21 DIAGNOSIS — K85.90 ACUTE PANCREATITIS WITHOUT NECROSIS OR INFECTION, UNSPECIFIED: ICD-10-CM

## 2017-07-21 DIAGNOSIS — K85.00 IDIOPATHIC ACUTE PANCREATITIS WITHOUT NECROSIS OR INFECTION: ICD-10-CM

## 2017-07-21 DIAGNOSIS — E66.3 OVERWEIGHT: ICD-10-CM

## 2017-07-21 DIAGNOSIS — Z29.9 ENCOUNTER FOR PROPHYLACTIC MEASURES, UNSPECIFIED: ICD-10-CM

## 2017-07-21 LAB
ALBUMIN SERPL ELPH-MCNC: 3.8 G/DL — SIGNIFICANT CHANGE UP (ref 3.3–5)
ALP SERPL-CCNC: 83 U/L — SIGNIFICANT CHANGE UP (ref 40–120)
ALT FLD-CCNC: 68 U/L — HIGH (ref 4–41)
AST SERPL-CCNC: 52 U/L — HIGH (ref 4–40)
BILIRUB SERPL-MCNC: 2 MG/DL — HIGH (ref 0.2–1.2)
BUN SERPL-MCNC: 9 MG/DL — SIGNIFICANT CHANGE UP (ref 7–23)
CALCIUM SERPL-MCNC: 8.7 MG/DL — SIGNIFICANT CHANGE UP (ref 8.4–10.5)
CHLORIDE SERPL-SCNC: 106 MMOL/L — SIGNIFICANT CHANGE UP (ref 98–107)
CHOLEST SERPL-MCNC: 198 MG/DL — SIGNIFICANT CHANGE UP (ref 120–199)
CO2 SERPL-SCNC: 25 MMOL/L — SIGNIFICANT CHANGE UP (ref 22–31)
CREAT SERPL-MCNC: 1.08 MG/DL — SIGNIFICANT CHANGE UP (ref 0.5–1.3)
GLUCOSE SERPL-MCNC: 111 MG/DL — HIGH (ref 70–99)
HDLC SERPL-MCNC: 57 MG/DL — HIGH (ref 35–55)
LACTATE SERPL-SCNC: 1.6 MMOL/L — SIGNIFICANT CHANGE UP (ref 0.5–2)
LIPID PNL WITH DIRECT LDL SERPL: 106 MG/DL — SIGNIFICANT CHANGE UP
MAGNESIUM SERPL-MCNC: 2.1 MG/DL — SIGNIFICANT CHANGE UP (ref 1.6–2.6)
PHOSPHATE SERPL-MCNC: 3 MG/DL — SIGNIFICANT CHANGE UP (ref 2.5–4.5)
POTASSIUM SERPL-MCNC: 3.6 MMOL/L — SIGNIFICANT CHANGE UP (ref 3.5–5.3)
POTASSIUM SERPL-SCNC: 3.6 MMOL/L — SIGNIFICANT CHANGE UP (ref 3.5–5.3)
PROT SERPL-MCNC: 6.3 G/DL — SIGNIFICANT CHANGE UP (ref 6–8.3)
SODIUM SERPL-SCNC: 144 MMOL/L — SIGNIFICANT CHANGE UP (ref 135–145)
TRIGL SERPL-MCNC: 338 MG/DL — HIGH (ref 10–149)

## 2017-07-21 PROCEDURE — 99223 1ST HOSP IP/OBS HIGH 75: CPT

## 2017-07-21 PROCEDURE — 74177 CT ABD & PELVIS W/CONTRAST: CPT | Mod: 26

## 2017-07-21 PROCEDURE — 12345: CPT | Mod: NC

## 2017-07-21 RX ORDER — SODIUM CHLORIDE 9 MG/ML
1000 INJECTION, SOLUTION INTRAVENOUS
Qty: 0 | Refills: 0 | Status: DISCONTINUED | OUTPATIENT
Start: 2017-07-21 | End: 2017-07-25

## 2017-07-21 RX ORDER — SODIUM CHLORIDE 9 MG/ML
1000 INJECTION, SOLUTION INTRAVENOUS
Qty: 0 | Refills: 0 | Status: DISCONTINUED | OUTPATIENT
Start: 2017-07-21 | End: 2017-07-21

## 2017-07-21 RX ORDER — DEXTROSE 50 % IN WATER 50 %
1 SYRINGE (ML) INTRAVENOUS ONCE
Qty: 0 | Refills: 0 | Status: DISCONTINUED | OUTPATIENT
Start: 2017-07-21 | End: 2017-07-27

## 2017-07-21 RX ORDER — HYDROMORPHONE HYDROCHLORIDE 2 MG/ML
0.5 INJECTION INTRAMUSCULAR; INTRAVENOUS; SUBCUTANEOUS EVERY 4 HOURS
Qty: 0 | Refills: 0 | Status: DISCONTINUED | OUTPATIENT
Start: 2017-07-21 | End: 2017-07-26

## 2017-07-21 RX ORDER — DEXTROSE 50 % IN WATER 50 %
25 SYRINGE (ML) INTRAVENOUS ONCE
Qty: 0 | Refills: 0 | Status: DISCONTINUED | OUTPATIENT
Start: 2017-07-21 | End: 2017-07-27

## 2017-07-21 RX ORDER — ATORVASTATIN CALCIUM 80 MG/1
10 TABLET, FILM COATED ORAL AT BEDTIME
Qty: 0 | Refills: 0 | Status: DISCONTINUED | OUTPATIENT
Start: 2017-07-21 | End: 2017-07-27

## 2017-07-21 RX ORDER — SODIUM CHLORIDE 9 MG/ML
1000 INJECTION, SOLUTION INTRAVENOUS
Qty: 0 | Refills: 0 | Status: DISCONTINUED | OUTPATIENT
Start: 2017-07-21 | End: 2017-07-27

## 2017-07-21 RX ORDER — HYDROMORPHONE HYDROCHLORIDE 2 MG/ML
1 INJECTION INTRAMUSCULAR; INTRAVENOUS; SUBCUTANEOUS ONCE
Qty: 0 | Refills: 0 | Status: DISCONTINUED | OUTPATIENT
Start: 2017-07-21 | End: 2017-07-20

## 2017-07-21 RX ORDER — ONDANSETRON 8 MG/1
4 TABLET, FILM COATED ORAL ONCE
Qty: 0 | Refills: 0 | Status: COMPLETED | OUTPATIENT
Start: 2017-07-21 | End: 2017-07-21

## 2017-07-21 RX ORDER — DEXTROSE 50 % IN WATER 50 %
12.5 SYRINGE (ML) INTRAVENOUS ONCE
Qty: 0 | Refills: 0 | Status: DISCONTINUED | OUTPATIENT
Start: 2017-07-21 | End: 2017-07-27

## 2017-07-21 RX ORDER — ENOXAPARIN SODIUM 100 MG/ML
40 INJECTION SUBCUTANEOUS EVERY 24 HOURS
Qty: 0 | Refills: 0 | Status: DISCONTINUED | OUTPATIENT
Start: 2017-07-21 | End: 2017-07-27

## 2017-07-21 RX ORDER — HYDROMORPHONE HYDROCHLORIDE 2 MG/ML
1 INJECTION INTRAMUSCULAR; INTRAVENOUS; SUBCUTANEOUS ONCE
Qty: 0 | Refills: 0 | Status: DISCONTINUED | OUTPATIENT
Start: 2017-07-21 | End: 2017-07-21

## 2017-07-21 RX ORDER — ONDANSETRON 8 MG/1
4 TABLET, FILM COATED ORAL EVERY 8 HOURS
Qty: 0 | Refills: 0 | Status: DISCONTINUED | OUTPATIENT
Start: 2017-07-21 | End: 2017-07-27

## 2017-07-21 RX ORDER — HYDROMORPHONE HYDROCHLORIDE 2 MG/ML
1 INJECTION INTRAMUSCULAR; INTRAVENOUS; SUBCUTANEOUS EVERY 4 HOURS
Qty: 0 | Refills: 0 | Status: DISCONTINUED | OUTPATIENT
Start: 2017-07-21 | End: 2017-07-26

## 2017-07-21 RX ORDER — GLUCAGON INJECTION, SOLUTION 0.5 MG/.1ML
1 INJECTION, SOLUTION SUBCUTANEOUS ONCE
Qty: 0 | Refills: 0 | Status: DISCONTINUED | OUTPATIENT
Start: 2017-07-21 | End: 2017-07-27

## 2017-07-21 RX ORDER — HYDROMORPHONE HYDROCHLORIDE 2 MG/ML
0.5 INJECTION INTRAMUSCULAR; INTRAVENOUS; SUBCUTANEOUS ONCE
Qty: 0 | Refills: 0 | Status: DISCONTINUED | OUTPATIENT
Start: 2017-07-21 | End: 2017-07-21

## 2017-07-21 RX ADMIN — HYDROMORPHONE HYDROCHLORIDE 1 MILLIGRAM(S): 2 INJECTION INTRAMUSCULAR; INTRAVENOUS; SUBCUTANEOUS at 05:39

## 2017-07-21 RX ADMIN — HYDROMORPHONE HYDROCHLORIDE 1 MILLIGRAM(S): 2 INJECTION INTRAMUSCULAR; INTRAVENOUS; SUBCUTANEOUS at 23:28

## 2017-07-21 RX ADMIN — SODIUM CHLORIDE 125 MILLILITER(S): 9 INJECTION, SOLUTION INTRAVENOUS at 20:58

## 2017-07-21 RX ADMIN — HYDROMORPHONE HYDROCHLORIDE 1 MILLIGRAM(S): 2 INJECTION INTRAMUSCULAR; INTRAVENOUS; SUBCUTANEOUS at 23:13

## 2017-07-21 RX ADMIN — SODIUM CHLORIDE 125 MILLILITER(S): 9 INJECTION, SOLUTION INTRAVENOUS at 09:21

## 2017-07-21 RX ADMIN — HYDROMORPHONE HYDROCHLORIDE 1 MILLIGRAM(S): 2 INJECTION INTRAMUSCULAR; INTRAVENOUS; SUBCUTANEOUS at 05:24

## 2017-07-21 RX ADMIN — HYDROMORPHONE HYDROCHLORIDE 1 MILLIGRAM(S): 2 INJECTION INTRAMUSCULAR; INTRAVENOUS; SUBCUTANEOUS at 18:06

## 2017-07-21 RX ADMIN — ONDANSETRON 4 MILLIGRAM(S): 8 TABLET, FILM COATED ORAL at 16:39

## 2017-07-21 RX ADMIN — ONDANSETRON 4 MILLIGRAM(S): 8 TABLET, FILM COATED ORAL at 09:20

## 2017-07-21 RX ADMIN — ATORVASTATIN CALCIUM 10 MILLIGRAM(S): 80 TABLET, FILM COATED ORAL at 21:03

## 2017-07-21 RX ADMIN — HYDROMORPHONE HYDROCHLORIDE 1 MILLIGRAM(S): 2 INJECTION INTRAMUSCULAR; INTRAVENOUS; SUBCUTANEOUS at 08:48

## 2017-07-21 RX ADMIN — HYDROMORPHONE HYDROCHLORIDE 1 MILLIGRAM(S): 2 INJECTION INTRAMUSCULAR; INTRAVENOUS; SUBCUTANEOUS at 13:44

## 2017-07-21 RX ADMIN — SODIUM CHLORIDE 125 MILLILITER(S): 9 INJECTION, SOLUTION INTRAVENOUS at 06:26

## 2017-07-21 RX ADMIN — HYDROMORPHONE HYDROCHLORIDE 0.5 MILLIGRAM(S): 2 INJECTION INTRAMUSCULAR; INTRAVENOUS; SUBCUTANEOUS at 20:58

## 2017-07-21 RX ADMIN — HYDROMORPHONE HYDROCHLORIDE 1 MILLIGRAM(S): 2 INJECTION INTRAMUSCULAR; INTRAVENOUS; SUBCUTANEOUS at 02:27

## 2017-07-21 RX ADMIN — HYDROMORPHONE HYDROCHLORIDE 0.5 MILLIGRAM(S): 2 INJECTION INTRAMUSCULAR; INTRAVENOUS; SUBCUTANEOUS at 21:13

## 2017-07-21 RX ADMIN — HYDROMORPHONE HYDROCHLORIDE 1 MILLIGRAM(S): 2 INJECTION INTRAMUSCULAR; INTRAVENOUS; SUBCUTANEOUS at 13:29

## 2017-07-21 RX ADMIN — HYDROMORPHONE HYDROCHLORIDE 1 MILLIGRAM(S): 2 INJECTION INTRAMUSCULAR; INTRAVENOUS; SUBCUTANEOUS at 17:51

## 2017-07-21 RX ADMIN — ENOXAPARIN SODIUM 40 MILLIGRAM(S): 100 INJECTION SUBCUTANEOUS at 06:26

## 2017-07-21 RX ADMIN — SODIUM CHLORIDE 125 MILLILITER(S): 9 INJECTION, SOLUTION INTRAVENOUS at 12:14

## 2017-07-21 RX ADMIN — HYDROMORPHONE HYDROCHLORIDE 1 MILLIGRAM(S): 2 INJECTION INTRAMUSCULAR; INTRAVENOUS; SUBCUTANEOUS at 09:03

## 2017-07-21 RX ADMIN — HYDROMORPHONE HYDROCHLORIDE 1 MILLIGRAM(S): 2 INJECTION INTRAMUSCULAR; INTRAVENOUS; SUBCUTANEOUS at 01:35

## 2017-07-21 NOTE — H&P ADULT - HISTORY OF PRESENT ILLNESS
57 y.o. man with history of pancreatitis secondary to ETOH abuse in 2010. Patient states that since the episode of pancreatitis, he discontinued alcohol use, however, he continued to experience pancreatitis approximately 3 times per day. Patient states that presently, his symptoms started 1 day ago with severe epigastric pain. He also reported experiencing nausea and vomiting X 3. Since arriving to the ER, the patient was noted to have an elevated amylase and lipase and was admitted to the medicine service for further treatment. No other complaints at present.

## 2017-07-21 NOTE — PROGRESS NOTE ADULT - ASSESSMENT
57 y.o. man with history of recurrent pancreatitis, pseudocyst and necrotizing pancreatitis in past s/p drainage in 2012, PE s/p A/C, now with acute pancreatitis and overweight

## 2017-07-21 NOTE — PROGRESS NOTE ADULT - PROBLEM SELECTOR PLAN 3
- Lovenox 40mg s/c daily IMPROVE score of 3, has hx of PE - Chronically elevated likely 2/2 hepatic steatosis   - Would not order repeat US at this time, continue with statin, dietary counseling

## 2017-07-21 NOTE — H&P ADULT - NSHPPHYSICALEXAM_GEN_ALL_CORE
Vital Signs Last 24 Hrs  T(C): 36.4 (21 Jul 2017 04:50), Max: 37.1 (21 Jul 2017 01:30)  T(F): 97.5 (21 Jul 2017 04:50), Max: 98.7 (21 Jul 2017 01:30)  HR: 96 (21 Jul 2017 04:50) (84 - 116)  BP: 143/99 (21 Jul 2017 04:50) (140/88 - 146/91)  BP(mean): --  RR: 18 (21 Jul 2017 04:50) (16 - 18)  SpO2: 99% (21 Jul 2017 04:50) (98% - 100%)

## 2017-07-21 NOTE — H&P ADULT - LYMPHATIC
posterior cervical L/supraclavicular L/anterior cervical R/supraclavicular R/anterior cervical L/posterior cervical R

## 2017-07-21 NOTE — H&P ADULT - NEGATIVE OPHTHALMOLOGIC SYMPTOMS
no blurred vision L/no lacrimation R/no lacrimation L/no diplopia/no photophobia/no blurred vision R

## 2017-07-21 NOTE — PROGRESS NOTE ADULT - PROBLEM SELECTOR PLAN 1
- Advance diet to full liquids today  - IVF change to LR @ 125 cc/hr, can d/c if patient is demonstrating adequate fluid intake   - Pain control with dilaudid; deescalate as soon as pain controlled  - Unclear what triggered this episode of pancreatitis; pt has been sober.  Possible consumption of high fat diet (eats lots of red meat) and TG are elevated to 300.  Continue with statin

## 2017-07-21 NOTE — PROGRESS NOTE ADULT - SUBJECTIVE AND OBJECTIVE BOX
Patient is a 57y old  Male who presents with a chief complaint of Epigastric pain radiating to the back (21 Jul 2017 05:04)      SUBJECTIVE / OVERNIGHT EVENTS: Feels better today.  Epigastric pain has improved since taking dose of IV dilaudid. No longer nauseous, wants to eat something today.  No fevers/chills, abd pain is now more localized and no longer in band like fashion.  Normal BM yesterday.  Has remained sober since February     MEDICATIONS  (STANDING):  enoxaparin Injectable 40 milliGRAM(s) SubCutaneous every 24 hours  dextrose 5%. 1000 milliLiter(s) (50 mL/Hr) IV Continuous <Continuous>  dextrose 50% Injectable 12.5 Gram(s) IV Push once  dextrose 50% Injectable 25 Gram(s) IV Push once  dextrose 50% Injectable 25 Gram(s) IV Push once  lactated ringers. 1000 milliLiter(s) (125 mL/Hr) IV Continuous <Continuous>  atorvastatin 10 milliGRAM(s) Oral at bedtime    MEDICATIONS  (PRN):  HYDROmorphone  Injectable 1 milliGRAM(s) IV Push every 4 hours PRN Severe Pain (7 - 10)  dextrose Gel 1 Dose(s) Oral once PRN Blood Glucose LESS THAN 70 milliGRAM(s)/deciliter  glucagon  Injectable 1 milliGRAM(s) IntraMuscular once PRN Glucose LESS THAN 70 milligrams/deciliter  HYDROmorphone  Injectable 0.5 milliGRAM(s) IV Push every 4 hours PRN Moderate Pain (4 - 6)      PHYSICAL EXAM:  GENERAL: NAD, well-developed  HEAD:  Atraumatic, Normocephalic  EYES: EOMI, PERRLA, conjunctiva and sclera clear  CHEST/LUNG: Clear to auscultation bilaterally; No wheeze  HEART: Regular rate and rhythm; No murmurs, rubs, or gallops  ABDOMEN: Soft, mild TTP in epigastric region   EXTREMITIES:  2+ Peripheral Pulses, No clubbing, cyanosis, or edema  PSYCH: AAOx3  NEUROLOGY: non-focal  SKIN: No rashes or lesions    LABS:                        16.0   14.35 )-----------( 256      ( 20 Jul 2017 22:10 )             47.2     07-21    144  |  106  |  9   ----------------------------<  111<H>  3.6   |  25  |  1.08    Ca    8.7      21 Jul 2017 05:59  Phos  3.0     07-21  Mg     2.1     07-21    TPro  6.3  /  Alb  3.8  /  TBili  2.0<H>  /  DBili  x   /  AST  52<H>  /  ALT  68<H>  /  AlkPhos  83  07-21              RADIOLOGY & ADDITIONAL TESTS:    Imaging Personally Reviewed: < from: CT Abdomen and Pelvis w/ IV Cont (07.21.17 @ 01:54) >  IMPRESSION:   Acute interstitial pancreatitis. No pancreatic necrosis or pseudocyst.          < end of copied text >

## 2017-07-22 LAB
ALBUMIN SERPL ELPH-MCNC: 3.6 G/DL — SIGNIFICANT CHANGE UP (ref 3.3–5)
ALP SERPL-CCNC: 81 U/L — SIGNIFICANT CHANGE UP (ref 40–120)
ALT FLD-CCNC: 58 U/L — HIGH (ref 4–41)
AST SERPL-CCNC: 61 U/L — HIGH (ref 4–40)
BILIRUB SERPL-MCNC: 2.2 MG/DL — HIGH (ref 0.2–1.2)
BUN SERPL-MCNC: 4 MG/DL — LOW (ref 7–23)
CALCIUM SERPL-MCNC: 9.2 MG/DL — SIGNIFICANT CHANGE UP (ref 8.4–10.5)
CHLORIDE SERPL-SCNC: 99 MMOL/L — SIGNIFICANT CHANGE UP (ref 98–107)
CO2 SERPL-SCNC: 23 MMOL/L — SIGNIFICANT CHANGE UP (ref 22–31)
CREAT SERPL-MCNC: 1 MG/DL — SIGNIFICANT CHANGE UP (ref 0.5–1.3)
GLUCOSE SERPL-MCNC: 112 MG/DL — HIGH (ref 70–99)
HCT VFR BLD CALC: 40 % — SIGNIFICANT CHANGE UP (ref 39–50)
HGB BLD-MCNC: 13.6 G/DL — SIGNIFICANT CHANGE UP (ref 13–17)
LIDOCAIN IGE QN: 192.1 U/L — HIGH (ref 7–60)
MCHC RBC-ENTMCNC: 29.2 PG — SIGNIFICANT CHANGE UP (ref 27–34)
MCHC RBC-ENTMCNC: 34 % — SIGNIFICANT CHANGE UP (ref 32–36)
MCV RBC AUTO: 86 FL — SIGNIFICANT CHANGE UP (ref 80–100)
NRBC # FLD: 0 — SIGNIFICANT CHANGE UP
PLATELET # BLD AUTO: 195 K/UL — SIGNIFICANT CHANGE UP (ref 150–400)
PMV BLD: 9.4 FL — SIGNIFICANT CHANGE UP (ref 7–13)
POTASSIUM SERPL-MCNC: 3.6 MMOL/L — SIGNIFICANT CHANGE UP (ref 3.5–5.3)
POTASSIUM SERPL-SCNC: 3.6 MMOL/L — SIGNIFICANT CHANGE UP (ref 3.5–5.3)
PROT SERPL-MCNC: 6.3 G/DL — SIGNIFICANT CHANGE UP (ref 6–8.3)
RBC # BLD: 4.65 M/UL — SIGNIFICANT CHANGE UP (ref 4.2–5.8)
RBC # FLD: 13.2 % — SIGNIFICANT CHANGE UP (ref 10.3–14.5)
SODIUM SERPL-SCNC: 137 MMOL/L — SIGNIFICANT CHANGE UP (ref 135–145)
WBC # BLD: 11.54 K/UL — HIGH (ref 3.8–10.5)
WBC # FLD AUTO: 11.54 K/UL — HIGH (ref 3.8–10.5)

## 2017-07-22 PROCEDURE — 99232 SBSQ HOSP IP/OBS MODERATE 35: CPT

## 2017-07-22 RX ADMIN — HYDROMORPHONE HYDROCHLORIDE 1 MILLIGRAM(S): 2 INJECTION INTRAMUSCULAR; INTRAVENOUS; SUBCUTANEOUS at 22:48

## 2017-07-22 RX ADMIN — HYDROMORPHONE HYDROCHLORIDE 1 MILLIGRAM(S): 2 INJECTION INTRAMUSCULAR; INTRAVENOUS; SUBCUTANEOUS at 03:48

## 2017-07-22 RX ADMIN — HYDROMORPHONE HYDROCHLORIDE 1 MILLIGRAM(S): 2 INJECTION INTRAMUSCULAR; INTRAVENOUS; SUBCUTANEOUS at 17:56

## 2017-07-22 RX ADMIN — HYDROMORPHONE HYDROCHLORIDE 1 MILLIGRAM(S): 2 INJECTION INTRAMUSCULAR; INTRAVENOUS; SUBCUTANEOUS at 17:43

## 2017-07-22 RX ADMIN — HYDROMORPHONE HYDROCHLORIDE 1 MILLIGRAM(S): 2 INJECTION INTRAMUSCULAR; INTRAVENOUS; SUBCUTANEOUS at 04:02

## 2017-07-22 RX ADMIN — SODIUM CHLORIDE 125 MILLILITER(S): 9 INJECTION, SOLUTION INTRAVENOUS at 19:30

## 2017-07-22 RX ADMIN — SODIUM CHLORIDE 125 MILLILITER(S): 9 INJECTION, SOLUTION INTRAVENOUS at 10:13

## 2017-07-22 RX ADMIN — ATORVASTATIN CALCIUM 10 MILLIGRAM(S): 80 TABLET, FILM COATED ORAL at 21:10

## 2017-07-22 RX ADMIN — HYDROMORPHONE HYDROCHLORIDE 1 MILLIGRAM(S): 2 INJECTION INTRAMUSCULAR; INTRAVENOUS; SUBCUTANEOUS at 10:28

## 2017-07-22 RX ADMIN — HYDROMORPHONE HYDROCHLORIDE 1 MILLIGRAM(S): 2 INJECTION INTRAMUSCULAR; INTRAVENOUS; SUBCUTANEOUS at 10:13

## 2017-07-22 RX ADMIN — HYDROMORPHONE HYDROCHLORIDE 1 MILLIGRAM(S): 2 INJECTION INTRAMUSCULAR; INTRAVENOUS; SUBCUTANEOUS at 22:33

## 2017-07-22 RX ADMIN — ENOXAPARIN SODIUM 40 MILLIGRAM(S): 100 INJECTION SUBCUTANEOUS at 05:22

## 2017-07-22 NOTE — PROGRESS NOTE ADULT - SUBJECTIVE AND OBJECTIVE BOX
Patient is a 57y old  Male who presents with a chief complaint of Epigastric pain radiating to the back (21 Jul 2017 05:04)      SUBJECTIVE / OVERNIGHT EVENTS:  c/o epigastric pain, worsened by food     MEDICATIONS  (STANDING):  enoxaparin Injectable 40 milliGRAM(s) SubCutaneous every 24 hours  dextrose 5%. 1000 milliLiter(s) (50 mL/Hr) IV Continuous <Continuous>  dextrose 50% Injectable 12.5 Gram(s) IV Push once  dextrose 50% Injectable 25 Gram(s) IV Push once  dextrose 50% Injectable 25 Gram(s) IV Push once  lactated ringers. 1000 milliLiter(s) (125 mL/Hr) IV Continuous <Continuous>  atorvastatin 10 milliGRAM(s) Oral at bedtime    MEDICATIONS  (PRN):  HYDROmorphone  Injectable 1 milliGRAM(s) IV Push every 4 hours PRN Severe Pain (7 - 10)  dextrose Gel 1 Dose(s) Oral once PRN Blood Glucose LESS THAN 70 milliGRAM(s)/deciliter  glucagon  Injectable 1 milliGRAM(s) IntraMuscular once PRN Glucose LESS THAN 70 milligrams/deciliter  HYDROmorphone  Injectable 0.5 milliGRAM(s) IV Push every 4 hours PRN Moderate Pain (4 - 6)  ondansetron Injectable 4 milliGRAM(s) IV Push every 8 hours PRN Nausea and/or Vomiting      T(C): 37.2 (07-22-17 @ 05:24), Max: 37.2 (07-22-17 @ 05:24)  HR: 100 (07-22-17 @ 10:11) (90 - 100)  BP: 140/85 (07-22-17 @ 10:11) (140/85 - 152/96)  RR: 16 (07-22-17 @ 10:11) (16 - 18)  SpO2: 96% (07-22-17 @ 10:11) (96% - 100%)  CAPILLARY BLOOD GLUCOSE        I&O's Summary      PHYSICAL EXAM:  GENERAL: NAD, well-developed  HEAD:  Atraumatic, Normocephalic  EYES: EOMI, PERRLA, conjunctiva and sclera clear  NECK: Supple, No JVD  CHEST/LUNG: Clear to auscultation bilaterally; No wheeze  HEART: s1 s2, regular rhythm and rate   ABDOMEN: Soft, TTP in epigastric region   EXTREMITIES:  2+ Peripheral Pulses, No clubbing, cyanosis, or edema  PSYCH: AAOx3, calm   NEUROLOGY: non-focal  SKIN: No rashes or lesions    LABS:                        13.6   11.54 )-----------( 195      ( 22 Jul 2017 06:34 )             40.0     07-22    137  |  99  |  4<L>  ----------------------------<  112<H>  3.6   |  23  |  1.00    Ca    9.2      22 Jul 2017 06:34  Phos  3.0     07-21  Mg     2.1     07-21    TPro  6.3  /  Alb  3.6  /  TBili  2.2<H>  /  DBili  x   /  AST  61<H>  /  ALT  58<H>  /  AlkPhos  81  07-22              RADIOLOGY & ADDITIONAL TESTS:    Imaging Personally Reviewed:    Consultant(s) Notes Reviewed:      Care Discussed with Consultants/Other Providers:

## 2017-07-22 NOTE — PROGRESS NOTE ADULT - PROBLEM SELECTOR PLAN 1
- diet as tolerated   - IVF LR @ 125 cc/hr  - Pain control with dilaudid; deescalate as soon as pain controlled  - Unclear what triggered this episode of pancreatitis; pt has been sober.  Possible consumption of high fat diet (eats lots of red meat) and TG are elevated to 300.  Continue with statin

## 2017-07-23 LAB
ALBUMIN SERPL ELPH-MCNC: 3.4 G/DL — SIGNIFICANT CHANGE UP (ref 3.3–5)
ALP SERPL-CCNC: 102 U/L — SIGNIFICANT CHANGE UP (ref 40–120)
ALT FLD-CCNC: 86 U/L — HIGH (ref 4–41)
AST SERPL-CCNC: 120 U/L — HIGH (ref 4–40)
BILIRUB SERPL-MCNC: 1.2 MG/DL — SIGNIFICANT CHANGE UP (ref 0.2–1.2)
BUN SERPL-MCNC: 5 MG/DL — LOW (ref 7–23)
CALCIUM SERPL-MCNC: 8.9 MG/DL — SIGNIFICANT CHANGE UP (ref 8.4–10.5)
CHLORIDE SERPL-SCNC: 103 MMOL/L — SIGNIFICANT CHANGE UP (ref 98–107)
CO2 SERPL-SCNC: 27 MMOL/L — SIGNIFICANT CHANGE UP (ref 22–31)
CREAT SERPL-MCNC: 1 MG/DL — SIGNIFICANT CHANGE UP (ref 0.5–1.3)
GLUCOSE SERPL-MCNC: 120 MG/DL — HIGH (ref 70–99)
HCT VFR BLD CALC: 37.8 % — LOW (ref 39–50)
HGB BLD-MCNC: 12.9 G/DL — LOW (ref 13–17)
MCHC RBC-ENTMCNC: 30.1 PG — SIGNIFICANT CHANGE UP (ref 27–34)
MCHC RBC-ENTMCNC: 34.1 % — SIGNIFICANT CHANGE UP (ref 32–36)
MCV RBC AUTO: 88.1 FL — SIGNIFICANT CHANGE UP (ref 80–100)
NRBC # FLD: 0 — SIGNIFICANT CHANGE UP
PLATELET # BLD AUTO: 190 K/UL — SIGNIFICANT CHANGE UP (ref 150–400)
PMV BLD: 9.8 FL — SIGNIFICANT CHANGE UP (ref 7–13)
POTASSIUM SERPL-MCNC: 3.5 MMOL/L — SIGNIFICANT CHANGE UP (ref 3.5–5.3)
POTASSIUM SERPL-SCNC: 3.5 MMOL/L — SIGNIFICANT CHANGE UP (ref 3.5–5.3)
PROT SERPL-MCNC: 6.2 G/DL — SIGNIFICANT CHANGE UP (ref 6–8.3)
RBC # BLD: 4.29 M/UL — SIGNIFICANT CHANGE UP (ref 4.2–5.8)
RBC # FLD: 13.2 % — SIGNIFICANT CHANGE UP (ref 10.3–14.5)
SODIUM SERPL-SCNC: 144 MMOL/L — SIGNIFICANT CHANGE UP (ref 135–145)
WBC # BLD: 7.93 K/UL — SIGNIFICANT CHANGE UP (ref 3.8–10.5)
WBC # FLD AUTO: 7.93 K/UL — SIGNIFICANT CHANGE UP (ref 3.8–10.5)

## 2017-07-23 PROCEDURE — 99232 SBSQ HOSP IP/OBS MODERATE 35: CPT

## 2017-07-23 RX ADMIN — SODIUM CHLORIDE 100 MILLILITER(S): 9 INJECTION, SOLUTION INTRAVENOUS at 21:26

## 2017-07-23 RX ADMIN — ATORVASTATIN CALCIUM 10 MILLIGRAM(S): 80 TABLET, FILM COATED ORAL at 21:26

## 2017-07-23 RX ADMIN — HYDROMORPHONE HYDROCHLORIDE 1 MILLIGRAM(S): 2 INJECTION INTRAMUSCULAR; INTRAVENOUS; SUBCUTANEOUS at 19:04

## 2017-07-23 RX ADMIN — ENOXAPARIN SODIUM 40 MILLIGRAM(S): 100 INJECTION SUBCUTANEOUS at 05:55

## 2017-07-23 RX ADMIN — HYDROMORPHONE HYDROCHLORIDE 1 MILLIGRAM(S): 2 INJECTION INTRAMUSCULAR; INTRAVENOUS; SUBCUTANEOUS at 08:25

## 2017-07-23 RX ADMIN — HYDROMORPHONE HYDROCHLORIDE 1 MILLIGRAM(S): 2 INJECTION INTRAMUSCULAR; INTRAVENOUS; SUBCUTANEOUS at 08:40

## 2017-07-23 RX ADMIN — HYDROMORPHONE HYDROCHLORIDE 1 MILLIGRAM(S): 2 INJECTION INTRAMUSCULAR; INTRAVENOUS; SUBCUTANEOUS at 18:49

## 2017-07-23 RX ADMIN — HYDROMORPHONE HYDROCHLORIDE 1 MILLIGRAM(S): 2 INJECTION INTRAMUSCULAR; INTRAVENOUS; SUBCUTANEOUS at 23:45

## 2017-07-23 NOTE — DIETITIAN INITIAL EVALUATION ADULT. - ENERGY NEEDS
Current weight 175lbs Ht 69" BMI 25.8kg/m2  IBW: 160lbs (+/-10%)  %IBW: 109%   No edema. Skin intact.

## 2017-07-23 NOTE — DIETITIAN INITIAL EVALUATION ADULT. - NS AS NUTRI INTERV ED CONTENT
Recommended modifications/Purpose of the nutrition education/Priority modifications/Nutrition relationship to health/disease

## 2017-07-23 NOTE — DIETITIAN INITIAL EVALUATION ADULT. - PERTINENT LABORATORY DATA
07-23 Na144 mmol/L Glu 120 mg/dL<H> K+ 3.5 mmol/L Cr  1.00 mg/dL BUN 5 mg/dL<L> Phos n/a   Alb 3.4 g/dL PAB n/a

## 2017-07-23 NOTE — PROGRESS NOTE ADULT - SUBJECTIVE AND OBJECTIVE BOX
Patient is a 57y old  Male who presents with a chief complaint of Epigastric pain radiating to the back (21 Jul 2017 05:04)      SUBJECTIVE / OVERNIGHT EVENTS:  reports 8/10 epigastric pain this morning requiring iv dilaudid. Now pt feels better, overall improving, tolerating diet. He also complains urinating too much, attributing to IVF. no other complaints.     MEDICATIONS  (STANDING):  enoxaparin Injectable 40 milliGRAM(s) SubCutaneous every 24 hours  dextrose 5%. 1000 milliLiter(s) (50 mL/Hr) IV Continuous <Continuous>  dextrose 50% Injectable 12.5 Gram(s) IV Push once  dextrose 50% Injectable 25 Gram(s) IV Push once  dextrose 50% Injectable 25 Gram(s) IV Push once  lactated ringers. 1000 milliLiter(s) (125 mL/Hr) IV Continuous <Continuous>  atorvastatin 10 milliGRAM(s) Oral at bedtime    MEDICATIONS  (PRN):  HYDROmorphone  Injectable 1 milliGRAM(s) IV Push every 4 hours PRN Severe Pain (7 - 10)  dextrose Gel 1 Dose(s) Oral once PRN Blood Glucose LESS THAN 70 milliGRAM(s)/deciliter  glucagon  Injectable 1 milliGRAM(s) IntraMuscular once PRN Glucose LESS THAN 70 milligrams/deciliter  HYDROmorphone  Injectable 0.5 milliGRAM(s) IV Push every 4 hours PRN Moderate Pain (4 - 6)  ondansetron Injectable 4 milliGRAM(s) IV Push every 8 hours PRN Nausea and/or Vomiting      T(C): 37.4 (07-23-17 @ 06:00), Max: 37.7 (07-22-17 @ 20:49)  HR: 92 (07-23-17 @ 08:39) (90 - 102)  BP: 130/80 (07-23-17 @ 08:39) (130/80 - 140/85)  RR: 16 (07-23-17 @ 08:24) (16 - 18)  SpO2: 98% (07-23-17 @ 08:24) (96% - 100%)  CAPILLARY BLOOD GLUCOSE        I&O's Summary      PHYSICAL EXAM:  GENERAL: NAD, well-developed  HEAD:  Atraumatic, Normocephalic  EYES: EOMI, PERRLA, conjunctiva and sclera clear  NECK: Supple, No JVD  CHEST/LUNG: Clear to auscultation bilaterally; No wheeze  HEART: s1 s2, regular rhythm and rate   ABDOMEN: distended, Bowel sounds present, "soreness" in epigastric region when pressed   EXTREMITIES:  2+ Peripheral Pulses, No clubbing, cyanosis, or edema  PSYCH: AAOx3, calm   NEUROLOGY: non-focal  SKIN: No rashes or lesions    LABS:                        12.9   7.93  )-----------( 190      ( 23 Jul 2017 06:30 )             37.8     07-23    144  |  103  |  5<L>  ----------------------------<  120<H>  3.5   |  27  |  1.00    Ca    8.9      23 Jul 2017 06:30    TPro  6.2  /  Alb  3.4  /  TBili  1.2  /  DBili  x   /  AST  120<H>  /  ALT  86<H>  /  AlkPhos  102  07-23              RADIOLOGY & ADDITIONAL TESTS:    Imaging Personally Reviewed:    Consultant(s) Notes Reviewed:      Care Discussed with Consultants/Other Providers:

## 2017-07-23 NOTE — DIETITIAN INITIAL EVALUATION ADULT. - OTHER INFO
Nutrition consult received for assessment. 56 y/o M with medical history significant of pancreatitis, ETOH abuse. Met with pt by bedside. Reports appetite improved and feeling better-same noted per Attending chart note 7/23/17. He is tolerating diet well. Denies any nausea/vomiting/diarrhea/constipation or difficulty chewing and swallowing. Pt reports food allergy to bananas and taking Multivitamin 1/daily prior to admission. Reviewed low fat/ pancreatitis nutrition therapy with the patient. RD also provided tips on nausea and vomiting prevention.  Pt was receptive and verbalized fair comprehension of the topics discussed. RDN remains available; pt made aware.

## 2017-07-23 NOTE — DIETITIAN INITIAL EVALUATION ADULT. - NS AS NUTRI INTERV MEALS SNACK
Composition of meals/snacks/1. Continue current diet order, which remains appropriate at this time. 2. Continue with Multivitamin 3. Monitor weights, labs, BM's, skin integrity, p.o. intake. 4. Please encourage PO intake and provide alternative as needed./Fat - modified diet/Other (specify)

## 2017-07-23 NOTE — PROGRESS NOTE ADULT - PROBLEM SELECTOR PLAN 1
- diet as tolerated   - IVF LR @ 100cc/hr  - Pain control with dilaudid prn.   - Unclear what triggered this episode of pancreatitis; pt has been sober.  Possible consumption of high fat diet (eats lots of red meat) and TG are elevated to 300.  Continue with statin

## 2017-07-24 LAB
ALBUMIN SERPL ELPH-MCNC: 3.5 G/DL — SIGNIFICANT CHANGE UP (ref 3.3–5)
ALP SERPL-CCNC: 99 U/L — SIGNIFICANT CHANGE UP (ref 40–120)
ALT FLD-CCNC: 89 U/L — HIGH (ref 4–41)
AST SERPL-CCNC: 76 U/L — HIGH (ref 4–40)
BILIRUB SERPL-MCNC: 0.7 MG/DL — SIGNIFICANT CHANGE UP (ref 0.2–1.2)
BUN SERPL-MCNC: 6 MG/DL — LOW (ref 7–23)
CALCIUM SERPL-MCNC: 8.9 MG/DL — SIGNIFICANT CHANGE UP (ref 8.4–10.5)
CHLORIDE SERPL-SCNC: 101 MMOL/L — SIGNIFICANT CHANGE UP (ref 98–107)
CO2 SERPL-SCNC: 28 MMOL/L — SIGNIFICANT CHANGE UP (ref 22–31)
CREAT SERPL-MCNC: 0.92 MG/DL — SIGNIFICANT CHANGE UP (ref 0.5–1.3)
GLUCOSE SERPL-MCNC: 114 MG/DL — HIGH (ref 70–99)
HCT VFR BLD CALC: 39.2 % — SIGNIFICANT CHANGE UP (ref 39–50)
HGB BLD-MCNC: 13.3 G/DL — SIGNIFICANT CHANGE UP (ref 13–17)
MCHC RBC-ENTMCNC: 29.9 PG — SIGNIFICANT CHANGE UP (ref 27–34)
MCHC RBC-ENTMCNC: 33.9 % — SIGNIFICANT CHANGE UP (ref 32–36)
MCV RBC AUTO: 88.1 FL — SIGNIFICANT CHANGE UP (ref 80–100)
NRBC # FLD: 0 — SIGNIFICANT CHANGE UP
PLATELET # BLD AUTO: 207 K/UL — SIGNIFICANT CHANGE UP (ref 150–400)
PMV BLD: 9.5 FL — SIGNIFICANT CHANGE UP (ref 7–13)
POTASSIUM SERPL-MCNC: 3.6 MMOL/L — SIGNIFICANT CHANGE UP (ref 3.5–5.3)
POTASSIUM SERPL-SCNC: 3.6 MMOL/L — SIGNIFICANT CHANGE UP (ref 3.5–5.3)
PROT SERPL-MCNC: 6.3 G/DL — SIGNIFICANT CHANGE UP (ref 6–8.3)
RBC # BLD: 4.45 M/UL — SIGNIFICANT CHANGE UP (ref 4.2–5.8)
RBC # FLD: 13.1 % — SIGNIFICANT CHANGE UP (ref 10.3–14.5)
SODIUM SERPL-SCNC: 140 MMOL/L — SIGNIFICANT CHANGE UP (ref 135–145)
WBC # BLD: 7.72 K/UL — SIGNIFICANT CHANGE UP (ref 3.8–10.5)
WBC # FLD AUTO: 7.72 K/UL — SIGNIFICANT CHANGE UP (ref 3.8–10.5)

## 2017-07-24 PROCEDURE — 99233 SBSQ HOSP IP/OBS HIGH 50: CPT

## 2017-07-24 RX ORDER — POLYETHYLENE GLYCOL 3350 17 G/17G
17 POWDER, FOR SOLUTION ORAL DAILY
Qty: 0 | Refills: 0 | Status: DISCONTINUED | OUTPATIENT
Start: 2017-07-24 | End: 2017-07-27

## 2017-07-24 RX ORDER — DOCUSATE SODIUM 100 MG
100 CAPSULE ORAL
Qty: 0 | Refills: 0 | Status: DISCONTINUED | OUTPATIENT
Start: 2017-07-24 | End: 2017-07-27

## 2017-07-24 RX ORDER — POLYETHYLENE GLYCOL 3350 17 G/17G
17 POWDER, FOR SOLUTION ORAL
Qty: 0 | Refills: 0 | Status: DISCONTINUED | OUTPATIENT
Start: 2017-07-24 | End: 2017-07-24

## 2017-07-24 RX ORDER — SENNA PLUS 8.6 MG/1
2 TABLET ORAL AT BEDTIME
Qty: 0 | Refills: 0 | Status: DISCONTINUED | OUTPATIENT
Start: 2017-07-24 | End: 2017-07-27

## 2017-07-24 RX ADMIN — HYDROMORPHONE HYDROCHLORIDE 1 MILLIGRAM(S): 2 INJECTION INTRAMUSCULAR; INTRAVENOUS; SUBCUTANEOUS at 14:05

## 2017-07-24 RX ADMIN — SODIUM CHLORIDE 75 MILLILITER(S): 9 INJECTION, SOLUTION INTRAVENOUS at 21:04

## 2017-07-24 RX ADMIN — HYDROMORPHONE HYDROCHLORIDE 1 MILLIGRAM(S): 2 INJECTION INTRAMUSCULAR; INTRAVENOUS; SUBCUTANEOUS at 09:20

## 2017-07-24 RX ADMIN — HYDROMORPHONE HYDROCHLORIDE 1 MILLIGRAM(S): 2 INJECTION INTRAMUSCULAR; INTRAVENOUS; SUBCUTANEOUS at 13:52

## 2017-07-24 RX ADMIN — Medication 100 MILLIGRAM(S): at 18:21

## 2017-07-24 RX ADMIN — HYDROMORPHONE HYDROCHLORIDE 1 MILLIGRAM(S): 2 INJECTION INTRAMUSCULAR; INTRAVENOUS; SUBCUTANEOUS at 23:31

## 2017-07-24 RX ADMIN — ATORVASTATIN CALCIUM 10 MILLIGRAM(S): 80 TABLET, FILM COATED ORAL at 21:04

## 2017-07-24 RX ADMIN — SENNA PLUS 2 TABLET(S): 8.6 TABLET ORAL at 21:04

## 2017-07-24 RX ADMIN — ENOXAPARIN SODIUM 40 MILLIGRAM(S): 100 INJECTION SUBCUTANEOUS at 05:28

## 2017-07-24 RX ADMIN — HYDROMORPHONE HYDROCHLORIDE 1 MILLIGRAM(S): 2 INJECTION INTRAMUSCULAR; INTRAVENOUS; SUBCUTANEOUS at 19:04

## 2017-07-24 RX ADMIN — SODIUM CHLORIDE 75 MILLILITER(S): 9 INJECTION, SOLUTION INTRAVENOUS at 13:53

## 2017-07-24 RX ADMIN — HYDROMORPHONE HYDROCHLORIDE 1 MILLIGRAM(S): 2 INJECTION INTRAMUSCULAR; INTRAVENOUS; SUBCUTANEOUS at 19:20

## 2017-07-24 RX ADMIN — HYDROMORPHONE HYDROCHLORIDE 1 MILLIGRAM(S): 2 INJECTION INTRAMUSCULAR; INTRAVENOUS; SUBCUTANEOUS at 09:06

## 2017-07-24 RX ADMIN — HYDROMORPHONE HYDROCHLORIDE 1 MILLIGRAM(S): 2 INJECTION INTRAMUSCULAR; INTRAVENOUS; SUBCUTANEOUS at 23:46

## 2017-07-24 RX ADMIN — SODIUM CHLORIDE 100 MILLILITER(S): 9 INJECTION, SOLUTION INTRAVENOUS at 09:08

## 2017-07-24 RX ADMIN — HYDROMORPHONE HYDROCHLORIDE 1 MILLIGRAM(S): 2 INJECTION INTRAMUSCULAR; INTRAVENOUS; SUBCUTANEOUS at 00:01

## 2017-07-24 NOTE — PROGRESS NOTE ADULT - ASSESSMENT
57 y.o. man with history of recurrent pancreatitis, pseudocyst and necrotizing pancreatitis in past s/p drainage in 2012, PE s/p A/C, now with acute pancreatitis.

## 2017-07-24 NOTE — PROGRESS NOTE ADULT - SUBJECTIVE AND OBJECTIVE BOX
Patient is a 57y old  Male who presents with a chief complaint of Epigastric pain radiating to the back (21 Jul 2017 05:04)        SUBJECTIVE / OVERNIGHT EVENTS: Pt was doing well until Sunday when his abdominal pain became exacerbated.  Unable to tolerate any of his regular diet due to worsening epigastric pain. Denies N/V.  Is passing gas, but hasn't had a BM since Thursday.        MEDICATIONS  (STANDING):  enoxaparin Injectable 40 milliGRAM(s) SubCutaneous every 24 hours  dextrose 5%. 1000 milliLiter(s) (50 mL/Hr) IV Continuous <Continuous>  dextrose 50% Injectable 12.5 Gram(s) IV Push once  dextrose 50% Injectable 25 Gram(s) IV Push once  dextrose 50% Injectable 25 Gram(s) IV Push once  lactated ringers. 1000 milliLiter(s) (75 mL/Hr) IV Continuous <Continuous>  atorvastatin 10 milliGRAM(s) Oral at bedtime  senna 2 Tablet(s) Oral at bedtime  docusate sodium 100 milliGRAM(s) Oral two times a day    MEDICATIONS  (PRN):  HYDROmorphone  Injectable 1 milliGRAM(s) IV Push every 4 hours PRN Severe Pain (7 - 10)  dextrose Gel 1 Dose(s) Oral once PRN Blood Glucose LESS THAN 70 milliGRAM(s)/deciliter  glucagon  Injectable 1 milliGRAM(s) IntraMuscular once PRN Glucose LESS THAN 70 milligrams/deciliter  HYDROmorphone  Injectable 0.5 milliGRAM(s) IV Push every 4 hours PRN Moderate Pain (4 - 6)  ondansetron Injectable 4 milliGRAM(s) IV Push every 8 hours PRN Nausea and/or Vomiting  polyethylene glycol 3350 17 Gram(s) Oral daily PRN Constipation      Vital Signs Last 24 Hrs  T(C): 36.7 (24 Jul 2017 05:26), Max: 36.7 (23 Jul 2017 20:41)  T(F): 98.1 (24 Jul 2017 05:26), Max: 98.1 (24 Jul 2017 05:26)  HR: 85 (24 Jul 2017 09:05) (83 - 99)  BP: 145/90 (24 Jul 2017 09:05) (126/79 - 145/90)  BP(mean): --  RR: 18 (24 Jul 2017 09:05) (16 - 18)  SpO2: 100% (24 Jul 2017 09:05) (95% - 100%)  CAPILLARY BLOOD GLUCOSE    PHYSICAL EXAM  GENERAL: Mild distress 2/2 pain   HEAD:  Atraumatic, Normocephalic  EYES: EOMI, PERRLA, conjunctiva and sclera clear  CHEST/LUNG: Clear to auscultation bilaterally; No wheeze  HEART: Regular rate and rhythm; No murmurs, rubs, or gallops  ABDOMEN: Soft, +Tenderness to palpation in epigastrium, +abd distention (mild).  +BS x4 quadrants   EXTREMITIES:  2+ Peripheral Pulses, No clubbing, cyanosis, or edema  PSYCH: AAOx3  SKIN: No rashes or lesions    LABS:                        13.3   7.72  )-----------( 207      ( 24 Jul 2017 06:45 )             39.2     07-24    140  |  101  |  6<L>  ----------------------------<  114<H>  3.6   |  28  |  0.92    Ca    8.9      24 Jul 2017 06:45    TPro  6.3  /  Alb  3.5  /  TBili  0.7  /  DBili  x   /  AST  76<H>  /  ALT  89<H>  /  AlkPhos  99  07-24

## 2017-07-24 NOTE — PROGRESS NOTE ADULT - PROBLEM SELECTOR PLAN 1
- Will deescalate diet to full liquids again   - IVF LR @ 75/cc hr, pt is tolerating liquids  - Pain control with dilaudid prn. Will deescalate as soon as possible.   - Unclear what triggered this episode of pancreatitis; pt has been sober.  Possible consumption of high fat diet (eats lots of red meat) and TG are elevated to 300.  Continue with statin

## 2017-07-25 PROCEDURE — 99233 SBSQ HOSP IP/OBS HIGH 50: CPT

## 2017-07-25 RX ADMIN — HYDROMORPHONE HYDROCHLORIDE 1 MILLIGRAM(S): 2 INJECTION INTRAMUSCULAR; INTRAVENOUS; SUBCUTANEOUS at 11:46

## 2017-07-25 RX ADMIN — SENNA PLUS 2 TABLET(S): 8.6 TABLET ORAL at 22:03

## 2017-07-25 RX ADMIN — HYDROMORPHONE HYDROCHLORIDE 1 MILLIGRAM(S): 2 INJECTION INTRAMUSCULAR; INTRAVENOUS; SUBCUTANEOUS at 04:46

## 2017-07-25 RX ADMIN — Medication 100 MILLIGRAM(S): at 18:08

## 2017-07-25 RX ADMIN — Medication 100 MILLIGRAM(S): at 04:30

## 2017-07-25 RX ADMIN — HYDROMORPHONE HYDROCHLORIDE 1 MILLIGRAM(S): 2 INJECTION INTRAMUSCULAR; INTRAVENOUS; SUBCUTANEOUS at 04:31

## 2017-07-25 RX ADMIN — HYDROMORPHONE HYDROCHLORIDE 1 MILLIGRAM(S): 2 INJECTION INTRAMUSCULAR; INTRAVENOUS; SUBCUTANEOUS at 23:56

## 2017-07-25 RX ADMIN — HYDROMORPHONE HYDROCHLORIDE 1 MILLIGRAM(S): 2 INJECTION INTRAMUSCULAR; INTRAVENOUS; SUBCUTANEOUS at 18:43

## 2017-07-25 RX ADMIN — ATORVASTATIN CALCIUM 10 MILLIGRAM(S): 80 TABLET, FILM COATED ORAL at 22:03

## 2017-07-25 RX ADMIN — HYDROMORPHONE HYDROCHLORIDE 1 MILLIGRAM(S): 2 INJECTION INTRAMUSCULAR; INTRAVENOUS; SUBCUTANEOUS at 18:59

## 2017-07-25 RX ADMIN — HYDROMORPHONE HYDROCHLORIDE 1 MILLIGRAM(S): 2 INJECTION INTRAMUSCULAR; INTRAVENOUS; SUBCUTANEOUS at 12:00

## 2017-07-25 RX ADMIN — ENOXAPARIN SODIUM 40 MILLIGRAM(S): 100 INJECTION SUBCUTANEOUS at 04:32

## 2017-07-25 NOTE — PROGRESS NOTE ADULT - PROBLEM SELECTOR PLAN 1
- Continue with full liquids for today, can advance to regular diet for dinner if pt is pain free.   - D/C IVF  - Pain control with dilaudid prn. Will deescalate as soon as possible.   - Unclear what triggered this episode of pancreatitis; pt has been sober.  Possible consumption of high fat diet (eats lots of red meat) and TG are elevated to 300.  Continue with statin

## 2017-07-25 NOTE — PROGRESS NOTE ADULT - SUBJECTIVE AND OBJECTIVE BOX
Patient is a 57y old  Male who presents with a chief complaint of Epigastric pain radiating to the back (21 Jul 2017 05:04)         SUBJECTIVE / OVERNIGHT EVENTS: Feels slightly better today.  Was able to have coffee and oatmeal this morning.  Had BM last night (nonbloody, normal).  Feels that he may be ready to advance diet tonight for dinner.        MEDICATIONS  (STANDING):  enoxaparin Injectable 40 milliGRAM(s) SubCutaneous every 24 hours  dextrose 5%. 1000 milliLiter(s) (50 mL/Hr) IV Continuous <Continuous>  dextrose 50% Injectable 12.5 Gram(s) IV Push once  dextrose 50% Injectable 25 Gram(s) IV Push once  dextrose 50% Injectable 25 Gram(s) IV Push once  atorvastatin 10 milliGRAM(s) Oral at bedtime  senna 2 Tablet(s) Oral at bedtime  docusate sodium 100 milliGRAM(s) Oral two times a day    MEDICATIONS  (PRN):  HYDROmorphone  Injectable 1 milliGRAM(s) IV Push every 4 hours PRN Severe Pain (7 - 10)  dextrose Gel 1 Dose(s) Oral once PRN Blood Glucose LESS THAN 70 milliGRAM(s)/deciliter  glucagon  Injectable 1 milliGRAM(s) IntraMuscular once PRN Glucose LESS THAN 70 milligrams/deciliter  HYDROmorphone  Injectable 0.5 milliGRAM(s) IV Push every 4 hours PRN Moderate Pain (4 - 6)  ondansetron Injectable 4 milliGRAM(s) IV Push every 8 hours PRN Nausea and/or Vomiting  polyethylene glycol 3350 17 Gram(s) Oral daily PRN Constipation      Vital Signs Last 24 Hrs  T(C): 36.8 (25 Jul 2017 04:29), Max: 36.8 (25 Jul 2017 04:29)  T(F): 98.3 (25 Jul 2017 04:29), Max: 98.3 (25 Jul 2017 04:29)  HR: 81 (25 Jul 2017 04:29) (81 - 89)  BP: 139/88 (25 Jul 2017 04:29) (134/91 - 145/80)  BP(mean): --  RR: 18 (25 Jul 2017 04:29) (18 - 18)  SpO2: 98% (25 Jul 2017 04:29) (98% - 100%)  CAPILLARY BLOOD GLUCOS        PHYSICAL EXAM  GENERAL: NAD, well-developed  CHEST/LUNG: Clear to auscultation bilaterally; No wheeze  HEART: Regular rate and rhythm; No murmurs, rubs, or gallops  ABDOMEN: Soft, mild tenderness in epigastrium, bowel sounds present   EXTREMITIES:  2+ Peripheral Pulses, No clubbing, cyanosis, or edema  PSYCH: AAOx3  SKIN: No rashes or lesions    LABS:                        13.3   7.72  )-----------( 207      ( 24 Jul 2017 06:45 )             39.2     07-24    140  |  101  |  6<L>  ----------------------------<  114<H>  3.6   |  28  |  0.92    Ca    8.9      24 Jul 2017 06:45    TPro  6.3  /  Alb  3.5  /  TBili  0.7  /  DBili  x   /  AST  76<H>  /  ALT  89<H>  /  AlkPhos  99  07-24

## 2017-07-26 ENCOUNTER — TRANSCRIPTION ENCOUNTER (OUTPATIENT)
Age: 57
End: 2017-07-26

## 2017-07-26 LAB
ALBUMIN SERPL ELPH-MCNC: 3.7 G/DL — SIGNIFICANT CHANGE UP (ref 3.3–5)
ALP SERPL-CCNC: 102 U/L — SIGNIFICANT CHANGE UP (ref 40–120)
ALT FLD-CCNC: 104 U/L — HIGH (ref 4–41)
AST SERPL-CCNC: 75 U/L — HIGH (ref 4–40)
BILIRUB SERPL-MCNC: 0.4 MG/DL — SIGNIFICANT CHANGE UP (ref 0.2–1.2)
BUN SERPL-MCNC: 10 MG/DL — SIGNIFICANT CHANGE UP (ref 7–23)
CALCIUM SERPL-MCNC: 9.7 MG/DL — SIGNIFICANT CHANGE UP (ref 8.4–10.5)
CHLORIDE SERPL-SCNC: 97 MMOL/L — LOW (ref 98–107)
CO2 SERPL-SCNC: 27 MMOL/L — SIGNIFICANT CHANGE UP (ref 22–31)
CREAT SERPL-MCNC: 1.14 MG/DL — SIGNIFICANT CHANGE UP (ref 0.5–1.3)
GLUCOSE SERPL-MCNC: 120 MG/DL — HIGH (ref 70–99)
HCT VFR BLD CALC: 41.6 % — SIGNIFICANT CHANGE UP (ref 39–50)
HGB BLD-MCNC: 13.6 G/DL — SIGNIFICANT CHANGE UP (ref 13–17)
LIDOCAIN IGE QN: 147.6 U/L — HIGH (ref 7–60)
MCHC RBC-ENTMCNC: 28.5 PG — SIGNIFICANT CHANGE UP (ref 27–34)
MCHC RBC-ENTMCNC: 32.7 % — SIGNIFICANT CHANGE UP (ref 32–36)
MCV RBC AUTO: 87.2 FL — SIGNIFICANT CHANGE UP (ref 80–100)
NRBC # FLD: 0 — SIGNIFICANT CHANGE UP
PLATELET # BLD AUTO: 230 K/UL — SIGNIFICANT CHANGE UP (ref 150–400)
PMV BLD: 9.6 FL — SIGNIFICANT CHANGE UP (ref 7–13)
POTASSIUM SERPL-MCNC: 3.9 MMOL/L — SIGNIFICANT CHANGE UP (ref 3.5–5.3)
POTASSIUM SERPL-SCNC: 3.9 MMOL/L — SIGNIFICANT CHANGE UP (ref 3.5–5.3)
PROT SERPL-MCNC: 6.5 G/DL — SIGNIFICANT CHANGE UP (ref 6–8.3)
RBC # BLD: 4.77 M/UL — SIGNIFICANT CHANGE UP (ref 4.2–5.8)
RBC # FLD: 13.2 % — SIGNIFICANT CHANGE UP (ref 10.3–14.5)
SODIUM SERPL-SCNC: 139 MMOL/L — SIGNIFICANT CHANGE UP (ref 135–145)
WBC # BLD: 8.71 K/UL — SIGNIFICANT CHANGE UP (ref 3.8–10.5)
WBC # FLD AUTO: 8.71 K/UL — SIGNIFICANT CHANGE UP (ref 3.8–10.5)

## 2017-07-26 PROCEDURE — 99239 HOSP IP/OBS DSCHRG MGMT >30: CPT

## 2017-07-26 RX ORDER — OXYCODONE HYDROCHLORIDE 5 MG/1
1 TABLET ORAL
Qty: 8 | Refills: 0 | OUTPATIENT
Start: 2017-07-26 | End: 2017-07-28

## 2017-07-26 RX ORDER — POLYETHYLENE GLYCOL 3350 17 G/17G
17 POWDER, FOR SOLUTION ORAL
Qty: 510 | Refills: 0 | OUTPATIENT
Start: 2017-07-26 | End: 2017-08-25

## 2017-07-26 RX ORDER — DOCUSATE SODIUM 100 MG
1 CAPSULE ORAL
Qty: 60 | Refills: 0 | OUTPATIENT
Start: 2017-07-26 | End: 2017-08-25

## 2017-07-26 RX ORDER — OXYCODONE HYDROCHLORIDE 5 MG/1
5 TABLET ORAL EVERY 6 HOURS
Qty: 0 | Refills: 0 | Status: DISCONTINUED | OUTPATIENT
Start: 2017-07-26 | End: 2017-07-27

## 2017-07-26 RX ORDER — OXYCODONE HYDROCHLORIDE 5 MG/1
5 TABLET ORAL ONCE
Qty: 0 | Refills: 0 | Status: DISCONTINUED | OUTPATIENT
Start: 2017-07-26 | End: 2017-07-26

## 2017-07-26 RX ADMIN — OXYCODONE HYDROCHLORIDE 5 MILLIGRAM(S): 5 TABLET ORAL at 10:42

## 2017-07-26 RX ADMIN — OXYCODONE HYDROCHLORIDE 5 MILLIGRAM(S): 5 TABLET ORAL at 23:22

## 2017-07-26 RX ADMIN — ENOXAPARIN SODIUM 40 MILLIGRAM(S): 100 INJECTION SUBCUTANEOUS at 05:34

## 2017-07-26 RX ADMIN — OXYCODONE HYDROCHLORIDE 5 MILLIGRAM(S): 5 TABLET ORAL at 17:44

## 2017-07-26 RX ADMIN — OXYCODONE HYDROCHLORIDE 5 MILLIGRAM(S): 5 TABLET ORAL at 16:45

## 2017-07-26 RX ADMIN — ATORVASTATIN CALCIUM 10 MILLIGRAM(S): 80 TABLET, FILM COATED ORAL at 22:33

## 2017-07-26 RX ADMIN — OXYCODONE HYDROCHLORIDE 5 MILLIGRAM(S): 5 TABLET ORAL at 11:30

## 2017-07-26 RX ADMIN — HYDROMORPHONE HYDROCHLORIDE 0.5 MILLIGRAM(S): 2 INJECTION INTRAMUSCULAR; INTRAVENOUS; SUBCUTANEOUS at 05:50

## 2017-07-26 RX ADMIN — HYDROMORPHONE HYDROCHLORIDE 0.5 MILLIGRAM(S): 2 INJECTION INTRAMUSCULAR; INTRAVENOUS; SUBCUTANEOUS at 05:34

## 2017-07-26 RX ADMIN — HYDROMORPHONE HYDROCHLORIDE 1 MILLIGRAM(S): 2 INJECTION INTRAMUSCULAR; INTRAVENOUS; SUBCUTANEOUS at 00:10

## 2017-07-26 NOTE — PROGRESS NOTE ADULT - PROBLEM SELECTOR PLAN 1
- Pt is now tolerating regular diet and pain improved  - D/C IVF  - Pain control with dilaudid prn. Will deescalate as soon as possible. Can send home with PO Oxy 5mg IR for 2 days   - Unclear what triggered this episode of pancreatitis; pt has been sober.  Possible consumption of high fat diet (eats lots of red meat) and TG are elevated to 300.  Continue with statin

## 2017-07-26 NOTE — DISCHARGE NOTE ADULT - PLAN OF CARE
Pt to f/u with 5 [facility] medicine clinic and GI clinic..  Please call to make an appointment within 1 week of discharge. remain free of pain remain stable Please follow up with your pcp and GI Pt was educated on healthier lifestyle with diet and exercise health weight normalize

## 2017-07-26 NOTE — DISCHARGE NOTE ADULT - CARE PLAN
Principal Discharge DX:	Pancreatitis  Goal:	remain free of pain  Instructions for follow-up, activity and diet:	Pt to f/u with 5 [facility] medicine clinic and GI clinic..  Please call to make an appointment within 1 week of discharge.  Secondary Diagnosis:	Transaminitis  Goal:	remain stable  Instructions for follow-up, activity and diet:	Please follow up with your pcp and GI  Secondary Diagnosis:	Overweight (BMI 25.0-29.9)  Goal:	health weight  Instructions for follow-up, activity and diet:	Pt was educated on healthier lifestyle with diet and exercise  Secondary Diagnosis:	Prediabetes  Goal:	normalize  Instructions for follow-up, activity and diet:	Pt was educated on healthier lifestyle with diet and exercise Principal Discharge DX:	Pancreatitis  Goal:	remain free of pain  Instructions for follow-up, activity and diet:	Pt to f/u with 5 [facility] medicine clinic and GI clinic..  Please call to make an appointment within 1 week of discharge.  Secondary Diagnosis:	Transaminitis  Goal:	remain stable  Instructions for follow-up, activity and diet:	Please follow up with your pcp and GI  Secondary Diagnosis:	Overweight (BMI 25.0-29.9)  Goal:	health weight  Instructions for follow-up, activity and diet:	Pt was educated on healthier lifestyle with diet and exercise  Secondary Diagnosis:	Prediabetes  Goal:	normalize  Instructions for follow-up, activity and diet:	Pt was educated on healthier lifestyle with diet and exercise Principal Discharge DX:	Pancreatitis  Goal:	remain free of pain  Instructions for follow-up, activity and diet:	Pt to f/u with 5 [facility] medicine clinic and GI clinic..  Please call to make an appointment within 1 week of discharge.  Secondary Diagnosis:	Transaminitis  Goal:	remain stable  Instructions for follow-up, activity and diet:	Please follow up with your pcp and GI  Secondary Diagnosis:	Overweight (BMI 25.0-29.9)  Goal:	health weight  Instructions for follow-up, activity and diet:	Pt was educated on healthier lifestyle with diet and exercise  Secondary Diagnosis:	Prediabetes  Goal:	normalize  Instructions for follow-up, activity and diet:	Pt was educated on healthier lifestyle with diet and exercise Principal Discharge DX:	Pancreatitis  Goal:	remain free of pain  Instructions for follow-up, activity and diet:	Pt to f/u with 5 [facility] medicine clinic and GI clinic..  Please call to make an appointment within 1 week of discharge.  Secondary Diagnosis:	Transaminitis  Goal:	remain stable  Instructions for follow-up, activity and diet:	Please follow up with your pcp and GI  Secondary Diagnosis:	Overweight (BMI 25.0-29.9)  Goal:	health weight  Instructions for follow-up, activity and diet:	Pt was educated on healthier lifestyle with diet and exercise  Secondary Diagnosis:	Prediabetes  Goal:	normalize  Instructions for follow-up, activity and diet:	Pt was educated on healthier lifestyle with diet and exercise Principal Discharge DX:	Pancreatitis  Goal:	remain free of pain  Instructions for follow-up, activity and diet:	Pt to f/u with 5 [facility] medicine clinic and GI clinic..  Please call to make an appointment within 1 week of discharge.  Secondary Diagnosis:	Transaminitis  Goal:	remain stable  Instructions for follow-up, activity and diet:	Please follow up with your pcp and GI  Secondary Diagnosis:	Overweight (BMI 25.0-29.9)  Goal:	health weight  Instructions for follow-up, activity and diet:	Pt was educated on healthier lifestyle with diet and exercise  Secondary Diagnosis:	Prediabetes  Goal:	normalize  Instructions for follow-up, activity and diet:	Pt was educated on healthier lifestyle with diet and exercise

## 2017-07-26 NOTE — DISCHARGE NOTE ADULT - PATIENT PORTAL LINK FT
“You can access the Follow[facility] Patient Portal, offered by [facility], by registering with the following website: [email or web address]”

## 2017-07-26 NOTE — PROGRESS NOTE ADULT - PROBLEM SELECTOR PLAN 4
- Lovenox 40mg s/c daily IMPROVE score of 3, has hx of PE
- Lovenox 40mg s/c daily IMPROVE score of 3, has hx of PE  - Pt is medically stable for d/c home today.  Pt to f/u with 5 [facility] medicine clinic and GI clinic. D/C time 35 minutes
- Lovenox 40mg s/c daily IMPROVE score of 3, has hx of PE

## 2017-07-26 NOTE — PROGRESS NOTE ADULT - SUBJECTIVE AND OBJECTIVE BOX
Patient is a 57y old  Male who presents with a chief complaint of Epigastric pain radiating to the back (21 Jul 2017 05:04)        SUBJECTIVE / OVERNIGHT EVENTS: Pt feeling better today.  Had two dinners last night (regular diet) and full breakfast and pain is improved.  No further N/V.       MEDICATIONS  (STANDING):  enoxaparin Injectable 40 milliGRAM(s) SubCutaneous every 24 hours  dextrose 5%. 1000 milliLiter(s) (50 mL/Hr) IV Continuous <Continuous>  dextrose 50% Injectable 12.5 Gram(s) IV Push once  dextrose 50% Injectable 25 Gram(s) IV Push once  dextrose 50% Injectable 25 Gram(s) IV Push once  atorvastatin 10 milliGRAM(s) Oral at bedtime  senna 2 Tablet(s) Oral at bedtime  docusate sodium 100 milliGRAM(s) Oral two times a day    MEDICATIONS  (PRN):  dextrose Gel 1 Dose(s) Oral once PRN Blood Glucose LESS THAN 70 milliGRAM(s)/deciliter  glucagon  Injectable 1 milliGRAM(s) IntraMuscular once PRN Glucose LESS THAN 70 milligrams/deciliter  ondansetron Injectable 4 milliGRAM(s) IV Push every 8 hours PRN Nausea and/or Vomiting  polyethylene glycol 3350 17 Gram(s) Oral daily PRN Constipation      Vital Signs Last 24 Hrs  T(C): 36.9 (26 Jul 2017 05:30), Max: 37.4 (25 Jul 2017 21:38)  T(F): 98.5 (26 Jul 2017 05:30), Max: 99.4 (25 Jul 2017 21:38)  HR: 80 (26 Jul 2017 05:50) (80 - 89)  BP: 126/76 (26 Jul 2017 05:50) (126/76 - 130/78)  BP(mean): --  RR: 18 (26 Jul 2017 05:50) (17 - 18)  SpO2: 98% (26 Jul 2017 05:50) (97% - 98%)  CAPILLARY BLOOD GLUCOSE      PHYSICAL EXAM  GENERAL: NAD, well-developed  CHEST/LUNG: Clear to auscultation bilaterally; No wheeze  HEART: Regular rate and rhythm; No murmurs, rubs, or gallops  ABDOMEN: Soft, Nontender, Nondistended; Bowel sounds present  EXTREMITIES:  2+ Peripheral Pulses, No clubbing, cyanosis, or edema  PSYCH: AAOx3  SKIN: No rashes or lesions    LABS:                        13.6   8.71  )-----------( 230      ( 26 Jul 2017 05:40 )             41.6     07-26    139  |  97<L>  |  10  ----------------------------<  120<H>  3.9   |  27  |  1.14    Ca    9.7      26 Jul 2017 05:40    TPro  6.5  /  Alb  3.7  /  TBili  0.4  /  DBili  x   /  AST  75<H>  /  ALT  104<H>  /  AlkPhos  102  07-26

## 2017-07-26 NOTE — DISCHARGE NOTE ADULT - HOSPITAL COURSE
57M p/w pancreatitis      Idiopathic acute pancreatitis   - 7/21 diet advanced to full liquids  - IVF change to LR @ 125 cc/hr  - Pain control with dilaudid;   - Continue with Statin.   - CT A/P- Acute interstitial pancreatitis. No pancreatic necrosis or pseudocyst.    PREDIABETES   - HgA1C- 6.3%  - consistent carbohydrate diet    TRANSAMINITIS   - Chronically elevated likely 2/2 hepatic steatosis   - Would not order repeat US at this time  - continue with statin, dietary counseling-     DVT PPX   - Lovenox 40mg s/c daily IMPROVE score of 3, has hx of PE. 57M hx of PE in past s/p A/C, chronic pancreatitis (sober since Feb 2017), HTN, HLD presents with acute on chronic pancreatitis. There was no clear trigger to this episode as pt has been sober since February.  He was admitted for IVF and pain control and was advanced to a regular diet.  His liver enzymes have been chronically elevated and US demonstrated hepatic steatosis.  He has been noncompliant with his diet, admitting to eating lots of red meats and fried foods and was counseled on nutrition and compliance with statin.  He was advised to follow up with PMD ([facility] clinic) and GI clinic. 57M hx of PE in past s/p A/C, chronic pancreatitis (sober since Feb 2017), HTN, HLD presents with acute on chronic pancreatitis. There was no clear trigger to this episode as pt has been sober since February.  He was admitted for IVF and pain control and was advanced to a regular diet.  His liver enzymes have been chronically elevated and US demonstrated hepatic steatosis.  He has been noncompliant with his diet, admitting to eating lots of red meats and fried foods and was counseled on nutrition and compliance with statin.  He was advised to follow up with PMD ([facility] clinic) and GI clinic.      Addendum: Patient called [facility] today (7/30) after attempting to  his oxycodone. The prescription wasn't covered by insurance and would cost him $12 for 8 pills. I contacted the [facility]'s pharmacy that stated Percocet would be covered at a $1 copay. Patient was prescribed 5 percocet, and is directed to see his provider tomorrow, to which he agreed.

## 2017-07-26 NOTE — DISCHARGE NOTE ADULT - MEDICATION SUMMARY - MEDICATIONS TO TAKE
I will START or STAY ON the medications listed below when I get home from the hospital:    oxyCODONE 5 mg oral tablet  -- 1 tab(s) by mouth every 6 hours, As Needed MDD:20mg  -- Caution federal law prohibits the transfer of this drug to any person other  than the person for whom it was prescribed.  It is very important that you take or use this exactly as directed.  Do not skip doses or discontinue unless directed by your doctor.  May cause drowsiness.  Alcohol may intensify this effect.  Use care when operating dangerous machinery.  This prescription cannot be refilled.  Using more of this medication than prescribed may cause serious breathing problems.    -- Indication: For Pancreatitis    lovastatin 40 mg oral tablet  -- 1 tab(s) by mouth once a day  -- Indication: For hyperlipidemia    docusate sodium 100 mg oral capsule  -- 1 cap(s) by mouth 2 times a day  -- Indication: For bowel regimen     polyethylene glycol 3350 oral powder for reconstitution  -- 17 gram(s) by mouth once a day, As needed, Constipation  -- Indication: For bowel regimen I will START or STAY ON the medications listed below when I get home from the hospital:    oxyCODONE 5 mg oral tablet  -- 1 tab(s) by mouth every 6 hours, As Needed MDD:20mg  -- Caution federal law prohibits the transfer of this drug to any person other  than the person for whom it was prescribed.  It is very important that you take or use this exactly as directed.  Do not skip doses or discontinue unless directed by your doctor.  May cause drowsiness.  Alcohol may intensify this effect.  Use care when operating dangerous machinery.  This prescription cannot be refilled.  Using more of this medication than prescribed may cause serious breathing problems.    -- Indication: For Prophylactic measure    lovastatin 40 mg oral tablet  -- 1 tab(s) by mouth once a day  -- Indication: For Prophylactic measure    docusate sodium 100 mg oral capsule  -- 1 cap(s) by mouth 2 times a day  -- Indication: For Prophylactic measure    polyethylene glycol 3350 oral powder for reconstitution  -- 17 gram(s) by mouth once a day, As needed, Constipation  -- Indication: For Prophylactic measure

## 2017-07-27 VITALS
RESPIRATION RATE: 18 BRPM | DIASTOLIC BLOOD PRESSURE: 80 MMHG | OXYGEN SATURATION: 98 % | SYSTOLIC BLOOD PRESSURE: 124 MMHG | TEMPERATURE: 98 F | HEART RATE: 90 BPM

## 2017-07-27 RX ADMIN — OXYCODONE HYDROCHLORIDE 5 MILLIGRAM(S): 5 TABLET ORAL at 00:15

## 2017-07-27 RX ADMIN — ENOXAPARIN SODIUM 40 MILLIGRAM(S): 100 INJECTION SUBCUTANEOUS at 05:46

## 2017-08-08 ENCOUNTER — OUTPATIENT (OUTPATIENT)
Dept: OUTPATIENT SERVICES | Facility: HOSPITAL | Age: 57
LOS: 1 days | End: 2017-08-08

## 2017-08-08 ENCOUNTER — LABORATORY RESULT (OUTPATIENT)
Age: 57
End: 2017-08-08

## 2017-08-08 ENCOUNTER — APPOINTMENT (OUTPATIENT)
Dept: INTERNAL MEDICINE | Facility: HOSPITAL | Age: 57
End: 2017-08-08

## 2017-08-08 VITALS — SYSTOLIC BLOOD PRESSURE: 128 MMHG | DIASTOLIC BLOOD PRESSURE: 84 MMHG | HEART RATE: 96 BPM

## 2017-08-08 VITALS — HEIGHT: 69 IN | WEIGHT: 173.94 LBS | BODY MASS INDEX: 25.76 KG/M2

## 2017-08-08 DIAGNOSIS — Z98.89 OTHER SPECIFIED POSTPROCEDURAL STATES: Chronic | ICD-10-CM

## 2017-08-08 LAB
ALBUMIN SERPL ELPH-MCNC: 4.5 G/DL — SIGNIFICANT CHANGE UP (ref 3.3–5)
ALP SERPL-CCNC: 106 U/L — SIGNIFICANT CHANGE UP (ref 40–120)
ALT FLD-CCNC: 98 U/L — HIGH (ref 4–41)
AMYLASE P1 CFR SERPL: 95 U/L — SIGNIFICANT CHANGE UP (ref 25–125)
AST SERPL-CCNC: 61 U/L — HIGH (ref 4–40)
BILIRUB SERPL-MCNC: 0.7 MG/DL — SIGNIFICANT CHANGE UP (ref 0.2–1.2)
BUN SERPL-MCNC: 13 MG/DL — SIGNIFICANT CHANGE UP (ref 7–23)
CALCIUM SERPL-MCNC: 9.9 MG/DL — SIGNIFICANT CHANGE UP (ref 8.4–10.5)
CHLORIDE SERPL-SCNC: 100 MMOL/L — SIGNIFICANT CHANGE UP (ref 98–107)
CO2 SERPL-SCNC: 20 MMOL/L — LOW (ref 22–31)
CREAT SERPL-MCNC: 1.07 MG/DL — SIGNIFICANT CHANGE UP (ref 0.5–1.3)
GLUCOSE SERPL-MCNC: 103 MG/DL — HIGH (ref 70–99)
LIDOCAIN IGE QN: 36.7 U/L — SIGNIFICANT CHANGE UP (ref 7–60)
POTASSIUM SERPL-MCNC: 4.5 MMOL/L — SIGNIFICANT CHANGE UP (ref 3.5–5.3)
POTASSIUM SERPL-SCNC: 4.5 MMOL/L — SIGNIFICANT CHANGE UP (ref 3.5–5.3)
PROT SERPL-MCNC: 8.1 G/DL — SIGNIFICANT CHANGE UP (ref 6–8.3)
SODIUM SERPL-SCNC: 141 MMOL/L — SIGNIFICANT CHANGE UP (ref 135–145)

## 2017-08-09 DIAGNOSIS — R74.8 ABNORMAL LEVELS OF OTHER SERUM ENZYMES: ICD-10-CM

## 2017-08-09 DIAGNOSIS — K86.0 ALCOHOL-INDUCED CHRONIC PANCREATITIS: ICD-10-CM

## 2017-08-09 DIAGNOSIS — R73.03 PREDIABETES: ICD-10-CM

## 2017-08-09 DIAGNOSIS — E78.5 HYPERLIPIDEMIA, UNSPECIFIED: ICD-10-CM

## 2017-08-31 ENCOUNTER — LABORATORY RESULT (OUTPATIENT)
Age: 57
End: 2017-08-31

## 2017-08-31 ENCOUNTER — APPOINTMENT (OUTPATIENT)
Dept: GASTROENTEROLOGY | Facility: HOSPITAL | Age: 57
End: 2017-08-31

## 2017-08-31 ENCOUNTER — OUTPATIENT (OUTPATIENT)
Dept: OUTPATIENT SERVICES | Facility: HOSPITAL | Age: 57
LOS: 1 days | End: 2017-08-31

## 2017-08-31 VITALS
SYSTOLIC BLOOD PRESSURE: 125 MMHG | DIASTOLIC BLOOD PRESSURE: 80 MMHG | BODY MASS INDEX: 25.33 KG/M2 | WEIGHT: 171 LBS | HEART RATE: 94 BPM | HEIGHT: 69 IN

## 2017-08-31 DIAGNOSIS — Z98.89 OTHER SPECIFIED POSTPROCEDURAL STATES: Chronic | ICD-10-CM

## 2017-08-31 LAB
ALBUMIN SERPL ELPH-MCNC: 4.8 G/DL — SIGNIFICANT CHANGE UP (ref 3.3–5)
ALP SERPL-CCNC: 94 U/L — SIGNIFICANT CHANGE UP (ref 40–120)
ALT FLD-CCNC: 65 U/L — HIGH (ref 4–41)
AST SERPL-CCNC: 38 U/L — SIGNIFICANT CHANGE UP (ref 4–40)
BILIRUB SERPL-MCNC: 0.7 MG/DL — SIGNIFICANT CHANGE UP (ref 0.2–1.2)
BUN SERPL-MCNC: 12 MG/DL — SIGNIFICANT CHANGE UP (ref 7–23)
CALCIUM SERPL-MCNC: 10 MG/DL — SIGNIFICANT CHANGE UP (ref 8.4–10.5)
CHLORIDE SERPL-SCNC: 100 MMOL/L — SIGNIFICANT CHANGE UP (ref 98–107)
CO2 SERPL-SCNC: 27 MMOL/L — SIGNIFICANT CHANGE UP (ref 22–31)
CREAT SERPL-MCNC: 1.18 MG/DL — SIGNIFICANT CHANGE UP (ref 0.5–1.3)
FERRITIN SERPL-MCNC: 115.2 NG/ML — SIGNIFICANT CHANGE UP (ref 30–400)
GLUCOSE SERPL-MCNC: 101 MG/DL — HIGH (ref 70–99)
IGA FLD-MCNC: 229 MG/DL — SIGNIFICANT CHANGE UP (ref 70–400)
IGG FLD-MCNC: 788 MG/DL — SIGNIFICANT CHANGE UP (ref 700–1600)
IGM SERPL-MCNC: 61 MG/DL — SIGNIFICANT CHANGE UP (ref 40–230)
IRON SATN MFR SERPL: 384 UG/DL — SIGNIFICANT CHANGE UP (ref 155–535)
IRON SATN MFR SERPL: 42 UG/DL — LOW (ref 45–165)
POTASSIUM SERPL-MCNC: 3.8 MMOL/L — SIGNIFICANT CHANGE UP (ref 3.5–5.3)
POTASSIUM SERPL-SCNC: 3.8 MMOL/L — SIGNIFICANT CHANGE UP (ref 3.5–5.3)
PROT SERPL-MCNC: 8.1 G/DL — SIGNIFICANT CHANGE UP (ref 6–8.3)
SODIUM SERPL-SCNC: 144 MMOL/L — SIGNIFICANT CHANGE UP (ref 135–145)
UIBC SERPL-MCNC: 342 UG/DL — SIGNIFICANT CHANGE UP (ref 110–370)

## 2017-09-01 DIAGNOSIS — R74.8 ABNORMAL LEVELS OF OTHER SERUM ENZYMES: ICD-10-CM

## 2017-09-01 DIAGNOSIS — K86.0 ALCOHOL-INDUCED CHRONIC PANCREATITIS: ICD-10-CM

## 2017-09-01 LAB
A1AT SERPL-MCNC: 134 MG/DL — SIGNIFICANT CHANGE UP (ref 90–200)
ANA TITR SER: NEGATIVE — SIGNIFICANT CHANGE UP
CERULOPLASMIN SERPL-MCNC: 29 MG/DL — SIGNIFICANT CHANGE UP (ref 20–60)
HAV IGG+IGM SER QL: NONREACTIVE — SIGNIFICANT CHANGE UP
HAV IGM SER-ACNC: NONREACTIVE — SIGNIFICANT CHANGE UP
HBV CORE AB SER-ACNC: NONREACTIVE — SIGNIFICANT CHANGE UP
HBV CORE IGM SER-ACNC: NONREACTIVE — SIGNIFICANT CHANGE UP
HBV SURFACE AB SER-ACNC: SIGNIFICANT CHANGE UP
HBV SURFACE AG SER-ACNC: NONREACTIVE — SIGNIFICANT CHANGE UP
HCV AB S/CO SERPL IA: 0.1 S/CO — SIGNIFICANT CHANGE UP
HCV AB SERPL-IMP: SIGNIFICANT CHANGE UP
MITOCHONDRIA AB SER-ACNC: SIGNIFICANT CHANGE UP
SMOOTH MUSCLE AB SER-ACNC: SIGNIFICANT CHANGE UP

## 2017-09-08 LAB — TRYPSINOGEN SERPL-MCNC: 72 NG/ML — LOW (ref 169–773)

## 2017-10-05 ENCOUNTER — EMERGENCY (EMERGENCY)
Facility: HOSPITAL | Age: 57
LOS: 1 days | Discharge: HOME | End: 2017-10-05
Attending: EMERGENCY MEDICINE | Admitting: EMERGENCY MEDICINE
Payer: MEDICAID

## 2017-10-05 VITALS
HEART RATE: 89 BPM | TEMPERATURE: 98 F | OXYGEN SATURATION: 99 % | SYSTOLIC BLOOD PRESSURE: 126 MMHG | RESPIRATION RATE: 18 BRPM | DIASTOLIC BLOOD PRESSURE: 75 MMHG

## 2017-10-05 VITALS
TEMPERATURE: 99 F | RESPIRATION RATE: 20 BRPM | DIASTOLIC BLOOD PRESSURE: 100 MMHG | SYSTOLIC BLOOD PRESSURE: 152 MMHG | HEART RATE: 84 BPM | OXYGEN SATURATION: 99 %

## 2017-10-05 DIAGNOSIS — Z98.89 OTHER SPECIFIED POSTPROCEDURAL STATES: Chronic | ICD-10-CM

## 2017-10-05 LAB
ALBUMIN SERPL ELPH-MCNC: 4.9 G/DL — SIGNIFICANT CHANGE UP (ref 3.3–5)
ALP SERPL-CCNC: 100 U/L — SIGNIFICANT CHANGE UP (ref 40–120)
ALT FLD-CCNC: 85 U/L — HIGH (ref 4–41)
AST SERPL-CCNC: 48 U/L — HIGH (ref 4–40)
BASE EXCESS BLDV CALC-SCNC: 2.7 MMOL/L — SIGNIFICANT CHANGE UP
BASE EXCESS BLDV CALC-SCNC: 4.6 MMOL/L — SIGNIFICANT CHANGE UP
BASOPHILS # BLD AUTO: 0.06 K/UL — SIGNIFICANT CHANGE UP (ref 0–0.2)
BASOPHILS NFR BLD AUTO: 0.5 % — SIGNIFICANT CHANGE UP (ref 0–2)
BILIRUB SERPL-MCNC: 0.9 MG/DL — SIGNIFICANT CHANGE UP (ref 0.2–1.2)
BLOOD GAS VENOUS - CREATININE: 1.02 MG/DL — SIGNIFICANT CHANGE UP (ref 0.5–1.3)
BLOOD GAS VENOUS - CREATININE: 1.06 MG/DL — SIGNIFICANT CHANGE UP (ref 0.5–1.3)
BUN SERPL-MCNC: 13 MG/DL — SIGNIFICANT CHANGE UP (ref 7–23)
CALCIUM SERPL-MCNC: 10 MG/DL — SIGNIFICANT CHANGE UP (ref 8.4–10.5)
CHLORIDE BLDV-SCNC: 102 MMOL/L — SIGNIFICANT CHANGE UP (ref 96–108)
CHLORIDE BLDV-SCNC: 106 MMOL/L — SIGNIFICANT CHANGE UP (ref 96–108)
CHLORIDE SERPL-SCNC: 101 MMOL/L — SIGNIFICANT CHANGE UP (ref 98–107)
CO2 SERPL-SCNC: 23 MMOL/L — SIGNIFICANT CHANGE UP (ref 22–31)
CREAT SERPL-MCNC: 1.31 MG/DL — HIGH (ref 0.5–1.3)
EOSINOPHIL # BLD AUTO: 0.2 K/UL — SIGNIFICANT CHANGE UP (ref 0–0.5)
EOSINOPHIL NFR BLD AUTO: 1.7 % — SIGNIFICANT CHANGE UP (ref 0–6)
GAS PNL BLDV: 135 MMOL/L — LOW (ref 136–146)
GAS PNL BLDV: 139 MMOL/L — SIGNIFICANT CHANGE UP (ref 136–146)
GLUCOSE BLDV-MCNC: 85 — SIGNIFICANT CHANGE UP (ref 70–99)
GLUCOSE BLDV-MCNC: 97 — SIGNIFICANT CHANGE UP (ref 70–99)
GLUCOSE SERPL-MCNC: 93 MG/DL — SIGNIFICANT CHANGE UP (ref 70–99)
HCO3 BLDV-SCNC: 25 MMOL/L — SIGNIFICANT CHANGE UP (ref 20–27)
HCO3 BLDV-SCNC: 25 MMOL/L — SIGNIFICANT CHANGE UP (ref 20–27)
HCT VFR BLD CALC: 52.7 % — HIGH (ref 39–50)
HCT VFR BLDV CALC: 46.4 % — SIGNIFICANT CHANGE UP (ref 39–51)
HCT VFR BLDV CALC: 52.4 % — HIGH (ref 39–51)
HGB BLD-MCNC: 16.7 G/DL — SIGNIFICANT CHANGE UP (ref 13–17)
HGB BLDV-MCNC: 15.1 G/DL — SIGNIFICANT CHANGE UP (ref 13–17)
HGB BLDV-MCNC: 17.1 G/DL — HIGH (ref 13–17)
IMM GRANULOCYTES # BLD AUTO: 0.05 # — SIGNIFICANT CHANGE UP
IMM GRANULOCYTES NFR BLD AUTO: 0.4 % — SIGNIFICANT CHANGE UP (ref 0–1.5)
LACTATE BLDV-MCNC: 2.8 MMOL/L — HIGH (ref 0.5–2)
LACTATE BLDV-MCNC: 3.4 MMOL/L — HIGH (ref 0.5–2)
LIDOCAIN IGE QN: 16 U/L — SIGNIFICANT CHANGE UP (ref 7–60)
LYMPHOCYTES # BLD AUTO: 1.89 K/UL — SIGNIFICANT CHANGE UP (ref 1–3.3)
LYMPHOCYTES # BLD AUTO: 15.7 % — SIGNIFICANT CHANGE UP (ref 13–44)
MCHC RBC-ENTMCNC: 28.2 PG — SIGNIFICANT CHANGE UP (ref 27–34)
MCHC RBC-ENTMCNC: 31.7 % — LOW (ref 32–36)
MCV RBC AUTO: 89 FL — SIGNIFICANT CHANGE UP (ref 80–100)
MONOCYTES # BLD AUTO: 0.68 K/UL — SIGNIFICANT CHANGE UP (ref 0–0.9)
MONOCYTES NFR BLD AUTO: 5.6 % — SIGNIFICANT CHANGE UP (ref 2–14)
NEUTROPHILS # BLD AUTO: 9.19 K/UL — HIGH (ref 1.8–7.4)
NEUTROPHILS NFR BLD AUTO: 76.1 % — SIGNIFICANT CHANGE UP (ref 43–77)
NRBC # FLD: 0 — SIGNIFICANT CHANGE UP
PCO2 BLDV: 54 MMHG — HIGH (ref 41–51)
PCO2 BLDV: 55 MMHG — HIGH (ref 41–51)
PH BLDV: 7.34 PH — SIGNIFICANT CHANGE UP (ref 7.32–7.43)
PH BLDV: 7.35 PH — SIGNIFICANT CHANGE UP (ref 7.32–7.43)
PLATELET # BLD AUTO: 279 K/UL — SIGNIFICANT CHANGE UP (ref 150–400)
PMV BLD: 9.4 FL — SIGNIFICANT CHANGE UP (ref 7–13)
PO2 BLDV: 33 MMHG — LOW (ref 35–40)
PO2 BLDV: < 24 MMHG — LOW (ref 35–40)
POTASSIUM BLDV-SCNC: 4.4 MMOL/L — SIGNIFICANT CHANGE UP (ref 3.4–4.5)
POTASSIUM BLDV-SCNC: 5.3 MMOL/L — HIGH (ref 3.4–4.5)
POTASSIUM SERPL-MCNC: 4.9 MMOL/L — SIGNIFICANT CHANGE UP (ref 3.5–5.3)
POTASSIUM SERPL-SCNC: 4.9 MMOL/L — SIGNIFICANT CHANGE UP (ref 3.5–5.3)
PROT SERPL-MCNC: 8.4 G/DL — HIGH (ref 6–8.3)
RBC # BLD: 5.92 M/UL — HIGH (ref 4.2–5.8)
RBC # FLD: 13.8 % — SIGNIFICANT CHANGE UP (ref 10.3–14.5)
SAO2 % BLDV: 29.8 % — LOW (ref 60–85)
SAO2 % BLDV: 55.7 % — LOW (ref 60–85)
SODIUM SERPL-SCNC: 145 MMOL/L — SIGNIFICANT CHANGE UP (ref 135–145)
WBC # BLD: 12.07 K/UL — HIGH (ref 3.8–10.5)
WBC # FLD AUTO: 12.07 K/UL — HIGH (ref 3.8–10.5)

## 2017-10-05 PROCEDURE — 74177 CT ABD & PELVIS W/CONTRAST: CPT | Mod: 26

## 2017-10-05 PROCEDURE — 99284 EMERGENCY DEPT VISIT MOD MDM: CPT

## 2017-10-05 RX ORDER — ACETAMINOPHEN 500 MG
1000 TABLET ORAL ONCE
Qty: 0 | Refills: 0 | Status: COMPLETED | OUTPATIENT
Start: 2017-10-05 | End: 2017-10-05

## 2017-10-05 RX ORDER — SODIUM CHLORIDE 9 MG/ML
2000 INJECTION INTRAMUSCULAR; INTRAVENOUS; SUBCUTANEOUS ONCE
Qty: 0 | Refills: 0 | Status: COMPLETED | OUTPATIENT
Start: 2017-10-05 | End: 2017-10-05

## 2017-10-05 RX ORDER — SODIUM CHLORIDE 9 MG/ML
1000 INJECTION INTRAMUSCULAR; INTRAVENOUS; SUBCUTANEOUS ONCE
Qty: 0 | Refills: 0 | Status: COMPLETED | OUTPATIENT
Start: 2017-10-05 | End: 2017-10-05

## 2017-10-05 RX ORDER — ONDANSETRON 8 MG/1
4 TABLET, FILM COATED ORAL ONCE
Qty: 0 | Refills: 0 | Status: COMPLETED | OUTPATIENT
Start: 2017-10-05 | End: 2017-10-05

## 2017-10-05 RX ADMIN — SODIUM CHLORIDE 1000 MILLILITER(S): 9 INJECTION INTRAMUSCULAR; INTRAVENOUS; SUBCUTANEOUS at 15:25

## 2017-10-05 RX ADMIN — SODIUM CHLORIDE 2000 MILLILITER(S): 9 INJECTION INTRAMUSCULAR; INTRAVENOUS; SUBCUTANEOUS at 13:42

## 2017-10-05 RX ADMIN — ONDANSETRON 4 MILLIGRAM(S): 8 TABLET, FILM COATED ORAL at 13:41

## 2017-10-05 RX ADMIN — Medication 400 MILLIGRAM(S): at 13:41

## 2017-10-05 NOTE — ED PROVIDER NOTE - OBJECTIVE STATEMENT
Dr. Newton: 57M h/o alcoholic pancreatitis (quit drinking 1 year ago, no h/o WD), with pancreatic pseudocyst and ?pancreatitic abscess requiring drainage p/w 1 day of epigastric pain that feels like his pancreatitis pain. No radiation. +multiple episodes of NBNB emesis, no BMs or flatus today. No fevers or chills. No dysuria or hematuria. Pt states he was at a party yesterday and drank ETOH yesterday.  Also h/o splenic vein thrombosis in the past, not anticoagulated now.

## 2017-10-05 NOTE — ED PROVIDER NOTE - PLAN OF CARE
You were seen today for your abdominal pain.  It is unclear what the cause is at this time.  Take acetaminophen for pain.  Avoid ibuprofen as it can irritate your stomach.  Take the prescribed pain medication for breakthrough pain.  Be sure to follow up with your primary care physician in 2-3 days for re-evaluation.  RETURN TO THE EMERGENCY DEPARTMENT IMMEDIATELY IF YOU DEVELOP SEVERE PAIN, CANNOT EAT OR DRINK, OR FOR ANY OTHER CONCERN. You were seen today for your abdominal pain.  It is unclear what the cause is at this time.  Take acetaminophen for pain.  Avoid ibuprofen as it can irritate your stomach.  Be sure to follow up with your primary care physician in 2-3 days for re-evaluation.  RETURN TO THE EMERGENCY DEPARTMENT IMMEDIATELY IF YOU DEVELOP SEVERE PAIN, CANNOT EAT OR DRINK, OR FOR ANY OTHER CONCERN.

## 2017-10-05 NOTE — ED PROVIDER NOTE - CARE PLAN
Principal Discharge DX:	Abdominal pain  Instructions for follow-up, activity and diet:	You were seen today for your abdominal pain.  It is unclear what the cause is at this time.  Take acetaminophen for pain.  Avoid ibuprofen as it can irritate your stomach.  Take the prescribed pain medication for breakthrough pain.  Be sure to follow up with your primary care physician in 2-3 days for re-evaluation.  RETURN TO THE EMERGENCY DEPARTMENT IMMEDIATELY IF YOU DEVELOP SEVERE PAIN, CANNOT EAT OR DRINK, OR FOR ANY OTHER CONCERN. Principal Discharge DX:	Abdominal pain  Instructions for follow-up, activity and diet:	You were seen today for your abdominal pain.  It is unclear what the cause is at this time.  Take acetaminophen for pain.  Avoid ibuprofen as it can irritate your stomach.  Be sure to follow up with your primary care physician in 2-3 days for re-evaluation.  RETURN TO THE EMERGENCY DEPARTMENT IMMEDIATELY IF YOU DEVELOP SEVERE PAIN, CANNOT EAT OR DRINK, OR FOR ANY OTHER CONCERN.

## 2017-10-05 NOTE — ED ADULT TRIAGE NOTE - CHIEF COMPLAINT QUOTE
c/o abdominal pain pt history of pancreatitis c/o vomiting x 3 this morning pt states "I had a drink yesterday I have not had a drink in one year"

## 2017-10-05 NOTE — ED PROVIDER NOTE - PROGRESS NOTE DETAILS
Pt with elevated lactate and hemoconcentration, likely due to dehydration, will repeat VBG after fluids Dr. Newton: Pt with elevated lactate and hemoconcentration, likely due to dehydration, will repeat VBG after fluids Dr. Newton: Pt reassessed, feels a lot better, wants to go home, abdomen soft, nt, nd. Prelim CT read neg, will f/u official read. Lactate and creatinine improved after fluids. MD CHO:  I received sign out on this patient from Dr. Newton.  Sign out plan:  after CT read, if no change vs prior, ok for discharge home.  CT read as no change, pt symptomatically improved, will dc home with pcp f/u.

## 2017-10-20 ENCOUNTER — APPOINTMENT (OUTPATIENT)
Dept: INTERNAL MEDICINE | Facility: HOSPITAL | Age: 57
End: 2017-10-20
Payer: MEDICAID

## 2017-10-20 ENCOUNTER — OUTPATIENT (OUTPATIENT)
Dept: OUTPATIENT SERVICES | Facility: HOSPITAL | Age: 57
LOS: 1 days | End: 2017-10-20

## 2017-10-20 VITALS — BODY MASS INDEX: 25.48 KG/M2 | WEIGHT: 172 LBS | HEIGHT: 69 IN

## 2017-10-20 VITALS — SYSTOLIC BLOOD PRESSURE: 126 MMHG | DIASTOLIC BLOOD PRESSURE: 81 MMHG | HEART RATE: 75 BPM

## 2017-10-20 DIAGNOSIS — Z98.89 OTHER SPECIFIED POSTPROCEDURAL STATES: Chronic | ICD-10-CM

## 2017-10-20 DIAGNOSIS — Z00.00 ENCOUNTER FOR GENERAL ADULT MEDICAL EXAMINATION W/OUT ABNORMAL FINDINGS: ICD-10-CM

## 2017-10-20 DIAGNOSIS — R74.8 ABNORMAL LEVELS OF OTHER SERUM ENZYMES: ICD-10-CM

## 2017-10-20 PROCEDURE — 99214 OFFICE O/P EST MOD 30 MIN: CPT | Mod: GC

## 2017-10-20 RX ORDER — ATORVASTATIN CALCIUM 20 MG/1
20 TABLET, FILM COATED ORAL
Qty: 30 | Refills: 5 | Status: ACTIVE | COMMUNITY
Start: 2017-10-20 | End: 1900-01-01

## 2017-10-23 DIAGNOSIS — E78.5 HYPERLIPIDEMIA, UNSPECIFIED: ICD-10-CM

## 2017-10-23 DIAGNOSIS — Z00.00 ENCOUNTER FOR GENERAL ADULT MEDICAL EXAMINATION WITHOUT ABNORMAL FINDINGS: ICD-10-CM

## 2017-10-23 DIAGNOSIS — K86.0 ALCOHOL-INDUCED CHRONIC PANCREATITIS: ICD-10-CM

## 2017-10-23 DIAGNOSIS — R73.03 PREDIABETES: ICD-10-CM

## 2017-10-23 DIAGNOSIS — F10.230 ALCOHOL DEPENDENCE WITH WITHDRAWAL, UNCOMPLICATED: ICD-10-CM

## 2017-10-23 DIAGNOSIS — R74.8 ABNORMAL LEVELS OF OTHER SERUM ENZYMES: ICD-10-CM

## 2017-11-30 ENCOUNTER — APPOINTMENT (OUTPATIENT)
Dept: GASTROENTEROLOGY | Facility: HOSPITAL | Age: 57
End: 2017-11-30

## 2017-12-22 ENCOUNTER — INPATIENT (INPATIENT)
Facility: HOSPITAL | Age: 57
LOS: 1 days | Discharge: HOME | End: 2017-12-24
Attending: STUDENT IN AN ORGANIZED HEALTH CARE EDUCATION/TRAINING PROGRAM | Admitting: STUDENT IN AN ORGANIZED HEALTH CARE EDUCATION/TRAINING PROGRAM
Payer: MEDICAID

## 2017-12-22 VITALS
OXYGEN SATURATION: 100 % | TEMPERATURE: 98 F | RESPIRATION RATE: 18 BRPM | DIASTOLIC BLOOD PRESSURE: 83 MMHG | HEART RATE: 90 BPM | SYSTOLIC BLOOD PRESSURE: 124 MMHG

## 2017-12-22 DIAGNOSIS — Z98.89 OTHER SPECIFIED POSTPROCEDURAL STATES: Chronic | ICD-10-CM

## 2017-12-22 DIAGNOSIS — K85.90 ACUTE PANCREATITIS WITHOUT NECROSIS OR INFECTION, UNSPECIFIED: ICD-10-CM

## 2017-12-22 LAB
ALBUMIN SERPL ELPH-MCNC: 3.9 G/DL — SIGNIFICANT CHANGE UP (ref 3.3–5)
ALP SERPL-CCNC: 78 U/L — SIGNIFICANT CHANGE UP (ref 40–120)
ALT FLD-CCNC: 31 U/L — SIGNIFICANT CHANGE UP (ref 4–41)
AMYLASE P1 CFR SERPL: 184 U/L — HIGH (ref 25–125)
APAP SERPL-MCNC: < 15 UG/ML — LOW (ref 15–25)
AST SERPL-CCNC: 36 U/L — SIGNIFICANT CHANGE UP (ref 4–40)
BARBITURATES MEASUREMENT: NEGATIVE — SIGNIFICANT CHANGE UP
BASE EXCESS BLDV CALC-SCNC: 5.3 MMOL/L — SIGNIFICANT CHANGE UP
BASOPHILS # BLD AUTO: 0.04 K/UL — SIGNIFICANT CHANGE UP (ref 0–0.2)
BASOPHILS NFR BLD AUTO: 0.4 % — SIGNIFICANT CHANGE UP (ref 0–2)
BENZODIAZ SERPL-MCNC: NEGATIVE — SIGNIFICANT CHANGE UP
BILIRUB SERPL-MCNC: 0.3 MG/DL — SIGNIFICANT CHANGE UP (ref 0.2–1.2)
BLOOD GAS VENOUS - CREATININE: 1.04 MG/DL — SIGNIFICANT CHANGE UP (ref 0.5–1.3)
BUN SERPL-MCNC: 16 MG/DL — SIGNIFICANT CHANGE UP (ref 7–23)
CALCIUM SERPL-MCNC: 8.3 MG/DL — LOW (ref 8.4–10.5)
CHLORIDE BLDV-SCNC: 108 MMOL/L — SIGNIFICANT CHANGE UP (ref 96–108)
CHLORIDE SERPL-SCNC: 101 MMOL/L — SIGNIFICANT CHANGE UP (ref 98–107)
CO2 SERPL-SCNC: 22 MMOL/L — SIGNIFICANT CHANGE UP (ref 22–31)
CREAT SERPL-MCNC: 1.08 MG/DL — SIGNIFICANT CHANGE UP (ref 0.5–1.3)
EOSINOPHIL # BLD AUTO: 0 K/UL — SIGNIFICANT CHANGE UP (ref 0–0.5)
EOSINOPHIL NFR BLD AUTO: 0 % — SIGNIFICANT CHANGE UP (ref 0–6)
ETHANOL BLD-MCNC: 154 MG/DL — HIGH
GAS PNL BLDV: 135 MMOL/L — LOW (ref 136–146)
GLUCOSE BLDV-MCNC: 110 — HIGH (ref 70–99)
GLUCOSE SERPL-MCNC: 106 MG/DL — HIGH (ref 70–99)
HCO3 BLDV-SCNC: 28 MMOL/L — HIGH (ref 20–27)
HCT VFR BLD CALC: 49.1 % — SIGNIFICANT CHANGE UP (ref 39–50)
HCT VFR BLDV CALC: 50.2 % — SIGNIFICANT CHANGE UP (ref 39–51)
HGB BLD-MCNC: 16.2 G/DL — SIGNIFICANT CHANGE UP (ref 13–17)
HGB BLDV-MCNC: 16.4 G/DL — SIGNIFICANT CHANGE UP (ref 13–17)
IMM GRANULOCYTES # BLD AUTO: 0.03 # — SIGNIFICANT CHANGE UP
IMM GRANULOCYTES NFR BLD AUTO: 0.3 % — SIGNIFICANT CHANGE UP (ref 0–1.5)
INR BLD: 1.05 — SIGNIFICANT CHANGE UP (ref 0.88–1.17)
LACTATE BLDV-MCNC: 4.4 MMOL/L — CRITICAL HIGH (ref 0.5–2)
LIDOCAIN IGE QN: 95.6 U/L — HIGH (ref 7–60)
LYMPHOCYTES # BLD AUTO: 1.4 K/UL — SIGNIFICANT CHANGE UP (ref 1–3.3)
LYMPHOCYTES # BLD AUTO: 15.3 % — SIGNIFICANT CHANGE UP (ref 13–44)
MCHC RBC-ENTMCNC: 29.6 PG — SIGNIFICANT CHANGE UP (ref 27–34)
MCHC RBC-ENTMCNC: 33 % — SIGNIFICANT CHANGE UP (ref 32–36)
MCV RBC AUTO: 89.8 FL — SIGNIFICANT CHANGE UP (ref 80–100)
MONOCYTES # BLD AUTO: 0.47 K/UL — SIGNIFICANT CHANGE UP (ref 0–0.9)
MONOCYTES NFR BLD AUTO: 5.1 % — SIGNIFICANT CHANGE UP (ref 2–14)
NEUTROPHILS # BLD AUTO: 7.24 K/UL — SIGNIFICANT CHANGE UP (ref 1.8–7.4)
NEUTROPHILS NFR BLD AUTO: 78.9 % — HIGH (ref 43–77)
NRBC # FLD: 0 — SIGNIFICANT CHANGE UP
PCO2 BLDV: 49 MMHG — SIGNIFICANT CHANGE UP (ref 41–51)
PH BLDV: 7.4 PH — SIGNIFICANT CHANGE UP (ref 7.32–7.43)
PLATELET # BLD AUTO: 228 K/UL — SIGNIFICANT CHANGE UP (ref 150–400)
PMV BLD: 8.8 FL — SIGNIFICANT CHANGE UP (ref 7–13)
PO2 BLDV: 41 MMHG — HIGH (ref 35–40)
POTASSIUM BLDV-SCNC: 4.2 MMOL/L — SIGNIFICANT CHANGE UP (ref 3.4–4.5)
POTASSIUM SERPL-MCNC: 4.7 MMOL/L — SIGNIFICANT CHANGE UP (ref 3.5–5.3)
POTASSIUM SERPL-SCNC: 4.7 MMOL/L — SIGNIFICANT CHANGE UP (ref 3.5–5.3)
PROT SERPL-MCNC: 7 G/DL — SIGNIFICANT CHANGE UP (ref 6–8.3)
PROTHROM AB SERPL-ACNC: 11.7 SEC — SIGNIFICANT CHANGE UP (ref 9.8–13.1)
RBC # BLD: 5.47 M/UL — SIGNIFICANT CHANGE UP (ref 4.2–5.8)
RBC # FLD: 14 % — SIGNIFICANT CHANGE UP (ref 10.3–14.5)
SALICYLATES SERPL-MCNC: < 5 MG/DL — LOW (ref 15–30)
SAO2 % BLDV: 70 % — SIGNIFICANT CHANGE UP (ref 60–85)
SODIUM SERPL-SCNC: 141 MMOL/L — SIGNIFICANT CHANGE UP (ref 135–145)
WBC # BLD: 9.18 K/UL — SIGNIFICANT CHANGE UP (ref 3.8–10.5)
WBC # FLD AUTO: 9.18 K/UL — SIGNIFICANT CHANGE UP (ref 3.8–10.5)

## 2017-12-22 PROCEDURE — 99223 1ST HOSP IP/OBS HIGH 75: CPT

## 2017-12-22 RX ORDER — SODIUM CHLORIDE 9 MG/ML
2000 INJECTION INTRAMUSCULAR; INTRAVENOUS; SUBCUTANEOUS ONCE
Qty: 0 | Refills: 0 | Status: COMPLETED | OUTPATIENT
Start: 2017-12-22 | End: 2017-12-22

## 2017-12-22 RX ORDER — ONDANSETRON 8 MG/1
4 TABLET, FILM COATED ORAL ONCE
Qty: 0 | Refills: 0 | Status: COMPLETED | OUTPATIENT
Start: 2017-12-22 | End: 2017-12-22

## 2017-12-22 RX ORDER — OXCARBAZEPINE 300 MG/1
900 TABLET, FILM COATED ORAL ONCE
Qty: 0 | Refills: 0 | Status: DISCONTINUED | OUTPATIENT
Start: 2017-12-22 | End: 2017-12-23

## 2017-12-22 RX ORDER — HYDROMORPHONE HYDROCHLORIDE 2 MG/ML
1 INJECTION INTRAMUSCULAR; INTRAVENOUS; SUBCUTANEOUS ONCE
Qty: 0 | Refills: 0 | Status: DISCONTINUED | OUTPATIENT
Start: 2017-12-22 | End: 2017-12-22

## 2017-12-22 RX ADMIN — SODIUM CHLORIDE 2000 MILLILITER(S): 9 INJECTION INTRAMUSCULAR; INTRAVENOUS; SUBCUTANEOUS at 22:11

## 2017-12-22 RX ADMIN — HYDROMORPHONE HYDROCHLORIDE 1 MILLIGRAM(S): 2 INJECTION INTRAMUSCULAR; INTRAVENOUS; SUBCUTANEOUS at 22:11

## 2017-12-22 RX ADMIN — ONDANSETRON 4 MILLIGRAM(S): 8 TABLET, FILM COATED ORAL at 22:11

## 2017-12-22 RX ADMIN — HYDROMORPHONE HYDROCHLORIDE 1 MILLIGRAM(S): 2 INJECTION INTRAMUSCULAR; INTRAVENOUS; SUBCUTANEOUS at 23:02

## 2017-12-22 NOTE — ED ADULT TRIAGE NOTE - CHIEF COMPLAINT QUOTE
Hx of chronic pancreatitis. Co mid abdominal pain since this afternoon. Co nausea and vomiting, denies diarrhea.

## 2017-12-22 NOTE — ED PROVIDER NOTE - OBJECTIVE STATEMENT
57M w/ chronic pancreatitis 2/2 alcohol use presents with abdominal pain. Patient said daughter attempted to commit suicide yesterday and patient began drinking alcohol early in the day due to stress. Drank half a pint of irene and 2 shots of vodka. Patient then developed abdominal pain during the afternoon. He had 3 episodes of n/v. Denies any bowel changes. Patient says abdominal pain is diffuse and usually radiates to back but this time has not radiated to the back (although the pain is similar). Last time this happened was September where he was admitted. Patient has no f/c.

## 2017-12-22 NOTE — ED PROVIDER NOTE - MEDICAL DECISION MAKING DETAILS
53M with pancreatitis presents with acute on chronic pancreatitis. Story is consistent - drank alcohol today and had this triggered. Will obtain labs, lipase/amylase. Pain control. Likely admit.

## 2017-12-22 NOTE — ED PROVIDER NOTE - ATTENDING CONTRIBUTION TO CARE
I performed a face-to-face evaluation of the patient and performed a history and physical examination. I agree with the history and physical examination.    ETOH-pancreatitis in past. Drank a lot of ETOH today b/c family stress. Abd TTP in epig. ETOH-pancreatitis vs. ETOH-gastritis. Plan: PPI, dilaudid, IVF, zofran, labs.

## 2017-12-22 NOTE — ED ADULT NURSE NOTE - ED STAT RN HANDOFF DETAILS
Patient is in A&Ox4, in NAD, aware of plan of care, and has room available.  Report given to nurse on floor via phone.  Patient awaiting transportation.  Will continue to monitor patient closely. MALENA Finnegan R.N.

## 2017-12-22 NOTE — ED ADULT NURSE NOTE - CHIEF COMPLAINT
The patient is a 57y Male complaining of generalized abdominal pain today after drink 2 shots of vodka.  Pmh of chronic pancreatitis.

## 2017-12-22 NOTE — ED PROVIDER NOTE - MUSCULOSKELETAL, MLM
Spine appears normal, range of motion is not limited, no muscle or joint tenderness. Has right shin scar.

## 2017-12-22 NOTE — ED ADULT NURSE NOTE - OBJECTIVE STATEMENT
A&O x3.  Alcohol on breath.  No tremor noted.  Seen by MD Talbert.  IV accessed.  Labs sent.  Medicated for the pain and nausea.

## 2017-12-23 DIAGNOSIS — K85.90 ACUTE PANCREATITIS WITHOUT NECROSIS OR INFECTION, UNSPECIFIED: ICD-10-CM

## 2017-12-23 DIAGNOSIS — K86.0 ALCOHOL-INDUCED CHRONIC PANCREATITIS: ICD-10-CM

## 2017-12-23 DIAGNOSIS — F10.20 ALCOHOL DEPENDENCE, UNCOMPLICATED: ICD-10-CM

## 2017-12-23 DIAGNOSIS — N17.9 ACUTE KIDNEY FAILURE, UNSPECIFIED: ICD-10-CM

## 2017-12-23 DIAGNOSIS — Z29.9 ENCOUNTER FOR PROPHYLACTIC MEASURES, UNSPECIFIED: ICD-10-CM

## 2017-12-23 LAB — LACTATE SERPL-SCNC: 1.4 MMOL/L — SIGNIFICANT CHANGE UP (ref 0.5–2)

## 2017-12-23 PROCEDURE — 12345: CPT | Mod: GC,NC

## 2017-12-23 RX ORDER — HEPARIN SODIUM 5000 [USP'U]/ML
5000 INJECTION INTRAVENOUS; SUBCUTANEOUS EVERY 8 HOURS
Qty: 0 | Refills: 0 | Status: DISCONTINUED | OUTPATIENT
Start: 2017-12-23 | End: 2017-12-24

## 2017-12-23 RX ORDER — SODIUM CHLORIDE 9 MG/ML
1000 INJECTION INTRAMUSCULAR; INTRAVENOUS; SUBCUTANEOUS
Qty: 0 | Refills: 0 | Status: DISCONTINUED | OUTPATIENT
Start: 2017-12-23 | End: 2017-12-23

## 2017-12-23 RX ORDER — OXYCODONE HYDROCHLORIDE 5 MG/1
5 TABLET ORAL EVERY 4 HOURS
Qty: 0 | Refills: 0 | Status: DISCONTINUED | OUTPATIENT
Start: 2017-12-23 | End: 2017-12-24

## 2017-12-23 RX ORDER — SIMETHICONE 80 MG/1
80 TABLET, CHEWABLE ORAL EVERY 6 HOURS
Qty: 0 | Refills: 0 | Status: DISCONTINUED | OUTPATIENT
Start: 2017-12-23 | End: 2017-12-24

## 2017-12-23 RX ORDER — SODIUM CHLORIDE 9 MG/ML
1000 INJECTION, SOLUTION INTRAVENOUS
Qty: 0 | Refills: 0 | Status: DISCONTINUED | OUTPATIENT
Start: 2017-12-23 | End: 2017-12-23

## 2017-12-23 RX ORDER — ONDANSETRON 8 MG/1
4 TABLET, FILM COATED ORAL ONCE
Qty: 0 | Refills: 0 | Status: COMPLETED | OUTPATIENT
Start: 2017-12-23 | End: 2017-12-23

## 2017-12-23 RX ORDER — HYDROMORPHONE HYDROCHLORIDE 2 MG/ML
0.5 INJECTION INTRAMUSCULAR; INTRAVENOUS; SUBCUTANEOUS ONCE
Qty: 0 | Refills: 0 | Status: DISCONTINUED | OUTPATIENT
Start: 2017-12-23 | End: 2017-12-23

## 2017-12-23 RX ORDER — HYDROMORPHONE HYDROCHLORIDE 2 MG/ML
1 INJECTION INTRAMUSCULAR; INTRAVENOUS; SUBCUTANEOUS EVERY 4 HOURS
Qty: 0 | Refills: 0 | Status: DISCONTINUED | OUTPATIENT
Start: 2017-12-23 | End: 2017-12-24

## 2017-12-23 RX ORDER — HYDROMORPHONE HYDROCHLORIDE 2 MG/ML
1 INJECTION INTRAMUSCULAR; INTRAVENOUS; SUBCUTANEOUS ONCE
Qty: 0 | Refills: 0 | Status: DISCONTINUED | OUTPATIENT
Start: 2017-12-23 | End: 2017-12-23

## 2017-12-23 RX ORDER — LOVASTATIN 20 MG
1 TABLET ORAL
Qty: 0 | Refills: 0 | COMMUNITY

## 2017-12-23 RX ORDER — SODIUM CHLORIDE 9 MG/ML
1000 INJECTION INTRAMUSCULAR; INTRAVENOUS; SUBCUTANEOUS
Qty: 0 | Refills: 0 | Status: DISCONTINUED | OUTPATIENT
Start: 2017-12-23 | End: 2017-12-24

## 2017-12-23 RX ORDER — INFLUENZA VIRUS VACCINE 15; 15; 15; 15 UG/.5ML; UG/.5ML; UG/.5ML; UG/.5ML
0.5 SUSPENSION INTRAMUSCULAR ONCE
Qty: 0 | Refills: 0 | Status: DISCONTINUED | OUTPATIENT
Start: 2017-12-23 | End: 2017-12-24

## 2017-12-23 RX ADMIN — HYDROMORPHONE HYDROCHLORIDE 1 MILLIGRAM(S): 2 INJECTION INTRAMUSCULAR; INTRAVENOUS; SUBCUTANEOUS at 19:30

## 2017-12-23 RX ADMIN — HYDROMORPHONE HYDROCHLORIDE 1 MILLIGRAM(S): 2 INJECTION INTRAMUSCULAR; INTRAVENOUS; SUBCUTANEOUS at 06:21

## 2017-12-23 RX ADMIN — HYDROMORPHONE HYDROCHLORIDE 1 MILLIGRAM(S): 2 INJECTION INTRAMUSCULAR; INTRAVENOUS; SUBCUTANEOUS at 06:36

## 2017-12-23 RX ADMIN — HYDROMORPHONE HYDROCHLORIDE 1 MILLIGRAM(S): 2 INJECTION INTRAMUSCULAR; INTRAVENOUS; SUBCUTANEOUS at 23:15

## 2017-12-23 RX ADMIN — HYDROMORPHONE HYDROCHLORIDE 1 MILLIGRAM(S): 2 INJECTION INTRAMUSCULAR; INTRAVENOUS; SUBCUTANEOUS at 15:06

## 2017-12-23 RX ADMIN — HYDROMORPHONE HYDROCHLORIDE 0.5 MILLIGRAM(S): 2 INJECTION INTRAMUSCULAR; INTRAVENOUS; SUBCUTANEOUS at 00:45

## 2017-12-23 RX ADMIN — ONDANSETRON 4 MILLIGRAM(S): 8 TABLET, FILM COATED ORAL at 03:34

## 2017-12-23 RX ADMIN — HYDROMORPHONE HYDROCHLORIDE 1 MILLIGRAM(S): 2 INJECTION INTRAMUSCULAR; INTRAVENOUS; SUBCUTANEOUS at 19:14

## 2017-12-23 RX ADMIN — HEPARIN SODIUM 5000 UNIT(S): 5000 INJECTION INTRAVENOUS; SUBCUTANEOUS at 06:21

## 2017-12-23 RX ADMIN — HYDROMORPHONE HYDROCHLORIDE 1 MILLIGRAM(S): 2 INJECTION INTRAMUSCULAR; INTRAVENOUS; SUBCUTANEOUS at 02:25

## 2017-12-23 RX ADMIN — HYDROMORPHONE HYDROCHLORIDE 1 MILLIGRAM(S): 2 INJECTION INTRAMUSCULAR; INTRAVENOUS; SUBCUTANEOUS at 23:30

## 2017-12-23 RX ADMIN — HYDROMORPHONE HYDROCHLORIDE 1 MILLIGRAM(S): 2 INJECTION INTRAMUSCULAR; INTRAVENOUS; SUBCUTANEOUS at 15:20

## 2017-12-23 RX ADMIN — HYDROMORPHONE HYDROCHLORIDE 1 MILLIGRAM(S): 2 INJECTION INTRAMUSCULAR; INTRAVENOUS; SUBCUTANEOUS at 02:10

## 2017-12-23 RX ADMIN — SODIUM CHLORIDE 150 MILLILITER(S): 9 INJECTION, SOLUTION INTRAVENOUS at 04:49

## 2017-12-23 RX ADMIN — SODIUM CHLORIDE 125 MILLILITER(S): 9 INJECTION INTRAMUSCULAR; INTRAVENOUS; SUBCUTANEOUS at 06:30

## 2017-12-23 RX ADMIN — SODIUM CHLORIDE 175 MILLILITER(S): 9 INJECTION INTRAMUSCULAR; INTRAVENOUS; SUBCUTANEOUS at 21:31

## 2017-12-23 RX ADMIN — HYDROMORPHONE HYDROCHLORIDE 1 MILLIGRAM(S): 2 INJECTION INTRAMUSCULAR; INTRAVENOUS; SUBCUTANEOUS at 10:16

## 2017-12-23 RX ADMIN — HYDROMORPHONE HYDROCHLORIDE 1 MILLIGRAM(S): 2 INJECTION INTRAMUSCULAR; INTRAVENOUS; SUBCUTANEOUS at 10:30

## 2017-12-23 RX ADMIN — HEPARIN SODIUM 5000 UNIT(S): 5000 INJECTION INTRAVENOUS; SUBCUTANEOUS at 21:30

## 2017-12-23 RX ADMIN — HYDROMORPHONE HYDROCHLORIDE 0.5 MILLIGRAM(S): 2 INJECTION INTRAMUSCULAR; INTRAVENOUS; SUBCUTANEOUS at 00:33

## 2017-12-23 NOTE — H&P ADULT - NSHPPHYSICALEXAM_GEN_ALL_CORE
Vital Signs Last 24 Hrs  T(C): 36.9 (23 Dec 2017 05:19), Max: 36.9 (22 Dec 2017 19:39)  T(F): 98.5 (23 Dec 2017 05:19), Max: 98.5 (22 Dec 2017 19:39)  HR: 90 (23 Dec 2017 05:19) (82 - 93)  BP: 146/92 (23 Dec 2017 05:19) (124/83 - 152/78)  BP(mean): --  RR: 18 (23 Dec 2017 05:19) (16 - 19)  SpO2: 99% (23 Dec 2017 05:19) (99% - 100%)    PHYSICAL EXAM:  GENERAL: No Acute Distress, smells of ETOH  HEAD:  Atraumatic, Normocephalic  EYES: conjunctiva and sclera clear  ENMT: Moist mucous membranes   NECK: Supple  CHEST/LUNG: Clear to auscultation bilaterally  HEART: Regular rate and rhythm; No murmurs, rubs, or gallops  ABDOMEN: Soft, Nontender, Nondistended; Bowel sounds normal  EXTREMITIES:   No clubbing, cyanosis, or pedal edema  VASCULAR: Normal DP pulses  PSYCH: Normal Affect, Normal Behavior  NEUROLOGY:   - Mental status A&O x 3,  - Motor: no tremor   SKIN: No rashes or lesions  MUSCULOSKELETAL: decreased muscle mass

## 2017-12-23 NOTE — H&P ADULT - NSHPLABSRESULTS_GEN_ALL_CORE
12-22    141  |  101  |  16  ----------------------------<  106<H>  4.7   |  22  |  1.08    Ca    8.3<L>      22 Dec 2017 21:30    TPro  7.0  /  Alb  3.9  /  TBili  0.3  /  DBili  x   /  AST  36  /  ALT  31  /  AlkPhos  78  12-22                            16.2   9.18  )-----------( 228      ( 22 Dec 2017 21:30 )             49.1             PT/INR - ( 22 Dec 2017 22:40 )   PT: 11.7 SEC;   INR: 1.05

## 2017-12-23 NOTE — PROGRESS NOTE ADULT - SUBJECTIVE AND OBJECTIVE BOX
Medicine Team 4 Accept Note  RAOUL Meehan MD, PGY1  Pager: [phone]      Patient is a 57y old  Male who presents with a chief complaint of abdominal pain (23 Dec 2017 05:43)    Brief HPI:    Med Hx:   Pulmonary embolism  Splenic vein thrombosis  Pseudocyst, pancreas  Pancreatitis  Abuse, Drug or Alcohol  S/P laparoscopic procedure: Lap cystgastrostomy in 2012      SUBJECTIVE / OVERNIGHT EVENTS:      Vital Signs Last 24 Hrs  T(C): 36.9 (23 Dec 2017 05:19), Max: 36.9 (22 Dec 2017 19:39)  T(F): 98.5 (23 Dec 2017 05:19), Max: 98.5 (22 Dec 2017 19:39)  HR: 90 (23 Dec 2017 05:19) (82 - 93)  BP: 146/92 (23 Dec 2017 05:19) (124/83 - 152/78)  BP(mean): --  RR: 18 (23 Dec 2017 05:19) (16 - 19)  SpO2: 99% (23 Dec 2017 05:19) (99% - 100%)      MEDICATIONS  (STANDING):  heparin  Injectable 5000 Unit(s) SubCutaneous every 8 hours  influenza   Vaccine 0.5 milliLiter(s) IntraMuscular once  sodium chloride 0.9%. 1000 milliLiter(s) (125 mL/Hr) IV Continuous <Continuous>    MEDICATIONS  (PRN):  HYDROmorphone  Injectable 1 milliGRAM(s) IV Push every 4 hours PRN Severe Pain (7 - 10)  LORazepam     Tablet 2 milliGRAM(s) Oral every 2 hours PRN CIWA-Ar score increase by 2 points and a total score of 7 or less  oxyCODONE    IR 5 milliGRAM(s) Oral every 4 hours PRN Moderate Pain (4 - 6)        CAPILLARY BLOOD GLUCOSE        I&O's Summary      PHYSICAL EXAM  GENERAL: NAD, well-developed  HEAD:  Atraumatic, Normocephalic  EYES: EOMI, PERRLA, conjunctiva and sclera clear  NECK: Supple, No JVD  CHEST/LUNG: Clear to auscultation bilaterally; No wheeze  HEART: Regular rate and rhythm; No murmurs, rubs, or gallops  ABDOMEN: Soft, Nontender, Nondistended; Bowel sounds present  EXTREMITIES:  2+ Peripheral Pulses, No clubbing, cyanosis, or edema  PSYCH: AAOx3  SKIN: No rashes or lesions    LABS:                        16.2   9.18  )-----------( 228      ( 22 Dec 2017 21:30 )             49.1     12-22    141  |  101  |  16  ----------------------------<  106<H>  4.7   |  22  |  1.08    Ca    8.3<L>      22 Dec 2017 21:30    TPro  7.0  /  Alb  3.9  /  TBili  0.3  /  DBili  x   /  AST  36  /  ALT  31  /  AlkPhos  78  12-22    PT/INR - ( 22 Dec 2017 22:40 )   PT: 11.7 SEC;   INR: 1.05            RADIOLOGY & ADDITIONAL TESTS:    Imaging Personally Reviewed:  Consultant(s) Notes Reviewed:    Care Discussed with Consultants/Other Providers: Medicine Team 4 Accept Note  RAOUL Meehan MD, PGY1  Pager: [phone]    Patient is a 57y old  Male who presents with a chief complaint of abdominal pain (23 Dec 2017 05:43)    Brief HPI: 56 y/o M w/ hx chronic alcoholic pancreatitis, remote     Med Hx:   Pulmonary embolism  Splenic vein thrombosis  Pseudocyst, pancreas  Pancreatitis  Abuse, Drug or Alcohol  S/P laparoscopic procedure: Lap cystgastrostomy in 2012      SUBJECTIVE / OVERNIGHT EVENTS:      Vital Signs Last 24 Hrs  T(C): 36.9 (23 Dec 2017 05:19), Max: 36.9 (22 Dec 2017 19:39)  T(F): 98.5 (23 Dec 2017 05:19), Max: 98.5 (22 Dec 2017 19:39)  HR: 90 (23 Dec 2017 05:19) (82 - 93)  BP: 146/92 (23 Dec 2017 05:19) (124/83 - 152/78)  BP(mean): --  RR: 18 (23 Dec 2017 05:19) (16 - 19)  SpO2: 99% (23 Dec 2017 05:19) (99% - 100%)      MEDICATIONS  (STANDING):  heparin  Injectable 5000 Unit(s) SubCutaneous every 8 hours  influenza   Vaccine 0.5 milliLiter(s) IntraMuscular once  sodium chloride 0.9%. 1000 milliLiter(s) (125 mL/Hr) IV Continuous <Continuous>    MEDICATIONS  (PRN):  HYDROmorphone  Injectable 1 milliGRAM(s) IV Push every 4 hours PRN Severe Pain (7 - 10)  LORazepam     Tablet 2 milliGRAM(s) Oral every 2 hours PRN CIWA-Ar score increase by 2 points and a total score of 7 or less  oxyCODONE    IR 5 milliGRAM(s) Oral every 4 hours PRN Moderate Pain (4 - 6)        CAPILLARY BLOOD GLUCOSE        I&O's Summary      PHYSICAL EXAM  GENERAL: NAD, well-developed  HEAD:  Atraumatic, Normocephalic  EYES: EOMI, PERRLA, conjunctiva and sclera clear  NECK: Supple, No JVD  CHEST/LUNG: Clear to auscultation bilaterally; No wheeze  HEART: Regular rate and rhythm; No murmurs, rubs, or gallops  ABDOMEN: Soft, Nontender, Nondistended; Bowel sounds present  EXTREMITIES:  2+ Peripheral Pulses, No clubbing, cyanosis, or edema  PSYCH: AAOx3  SKIN: No rashes or lesions    LABS:                        16.2   9.18  )-----------( 228      ( 22 Dec 2017 21:30 )             49.1     12-22    141  |  101  |  16  ----------------------------<  106<H>  4.7   |  22  |  1.08    Ca    8.3<L>      22 Dec 2017 21:30    TPro  7.0  /  Alb  3.9  /  TBili  0.3  /  DBili  x   /  AST  36  /  ALT  31  /  AlkPhos  78  12-22    PT/INR - ( 22 Dec 2017 22:40 )   PT: 11.7 SEC;   INR: 1.05            RADIOLOGY & ADDITIONAL TESTS:    Imaging Personally Reviewed:  Consultant(s) Notes Reviewed:    Care Discussed with Consultants/Other Providers: Medicine Team 4 Accept Note  RAOUL Meehan MD, PGY1  Pager: [phone]    Patient is a 57y old  Male who presents with a chief complaint of abdominal pain (23 Dec 2017 05:43)    Brief HPI: 58 y/o M w/ hx chronic alcoholic pancreatitis, pseudocyst, splenic thrombosis, ?PE, HLD, p/w abd pain and n/v.    Med Hx:   Pulmonary embolism  Splenic vein thrombosis  Pseudocyst, pancreas  Pancreatitis  Abuse, Drug or Alcohol  S/P laparoscopic procedure: Lap cystgastrostomy in 2012      SUBJECTIVE / OVERNIGHT EVENTS:  No acute overnight events. Pain somewhat lessoned. No vomiting overnight. Pt still having difficulty tolerating water sips. Ambulating, will try to advance to clears today. Not yet passing flatus.       Vital Signs Last 24 Hrs  T(C): 36.9 (23 Dec 2017 05:19), Max: 36.9 (22 Dec 2017 19:39)  T(F): 98.5 (23 Dec 2017 05:19), Max: 98.5 (22 Dec 2017 19:39)  HR: 90 (23 Dec 2017 05:19) (82 - 93)  BP: 146/92 (23 Dec 2017 05:19) (124/83 - 152/78)  RR: 18 (23 Dec 2017 05:19) (16 - 19)  SpO2: 99% (23 Dec 2017 05:19) (99% - 100%)      MEDICATIONS  (STANDING):  heparin  Injectable 5000 Unit(s) SubCutaneous every 8 hours  influenza   Vaccine 0.5 milliLiter(s) IntraMuscular once  sodium chloride 0.9%. 1000 milliLiter(s) (125 mL/Hr) IV Continuous <Continuous>    MEDICATIONS  (PRN):  HYDROmorphone  Injectable 1 milliGRAM(s) IV Push every 4 hours PRN Severe Pain (7 - 10)  LORazepam     Tablet 2 milliGRAM(s) Oral every 2 hours PRN CIWA-Ar score increase by 2 points and a total score of 7 or less  oxyCODONE    IR 5 milliGRAM(s) Oral every 4 hours PRN Moderate Pain (4 - 6)      I&O's Summary      PHYSICAL EXAM  GENERAL: NAD, well-developed, poor  HEAD:  Atraumatic, Normocephalic  EYES: conjunctiva and sclera clear  NECK: Supple, No JVD  CHEST/LUNG: Clear to auscultation bilaterally; No wheeze or crackles  HEART: Regular rate and rhythm; No murmurs, rubs, or gallops  ABDOMEN: Soft, tender, distended; Bowel sounds present  EXTREMITIES:  2+ Peripheral Pulses, No clubbing, cyanosis, or edema  PSYCH: AAOx3  SKIN: No rashes or lesions    LABS:                        16.2   9.18  )-----------( 228      ( 22 Dec 2017 21:30 )             49.1     12-22    141  |  101  |  16  ----------------------------<  106<H>  4.7   |  22  |  1.08    Ca    8.3<L>      22 Dec 2017 21:30    TPro  7.0  /  Alb  3.9  /  TBili  0.3  /  DBili  x   /  AST  36  /  ALT  31  /  AlkPhos  78  12-22    PT/INR - ( 22 Dec 2017 22:40 )   PT: 11.7 SEC;   INR: 1.05            RADIOLOGY & ADDITIONAL TESTS:    Imaging Personally Reviewed:  Consultant(s) Notes Reviewed:    Care Discussed with Consultants/Other Providers: Medicine Team 4 Accept Note  RAOUL Meehan MD, PGY1  Pager: [phone]    Patient is a 57y old  Male who presents with a chief complaint of abdominal pain (23 Dec 2017 05:43)    Brief HPI: 56 y/o M w/ hx chronic alcoholic pancreatitis, pseudocyst, splenic thrombosis, ?PE, HLD, p/w abd pain and n/v.    Med Hx:   Pulmonary embolism  Splenic vein thrombosis  Pseudocyst, pancreas  Pancreatitis  Abuse, Drug or Alcohol  S/P laparoscopic procedure: Lap cystgastrostomy in 2012      SUBJECTIVE / OVERNIGHT EVENTS:  No acute overnight events. Pain somewhat lessoned. No vomiting overnight. Pt still having difficulty tolerating water sips. Ambulating, will try to advance to clears today. Not yet passing flatus.       Vital Signs Last 24 Hrs  T(C): 36.9 (23 Dec 2017 05:19), Max: 36.9 (22 Dec 2017 19:39)  T(F): 98.5 (23 Dec 2017 05:19), Max: 98.5 (22 Dec 2017 19:39)  HR: 90 (23 Dec 2017 05:19) (82 - 93)  BP: 146/92 (23 Dec 2017 05:19) (124/83 - 152/78)  RR: 18 (23 Dec 2017 05:19) (16 - 19)  SpO2: 99% (23 Dec 2017 05:19) (99% - 100%)      MEDICATIONS  (STANDING):  heparin  Injectable 5000 Unit(s) SubCutaneous every 8 hours  influenza   Vaccine 0.5 milliLiter(s) IntraMuscular once  sodium chloride 0.9%. 1000 milliLiter(s) (125 mL/Hr) IV Continuous <Continuous>    MEDICATIONS  (PRN):  HYDROmorphone  Injectable 1 milliGRAM(s) IV Push every 4 hours PRN Severe Pain (7 - 10)  LORazepam     Tablet 2 milliGRAM(s) Oral every 2 hours PRN CIWA-Ar score increase by 2 points and a total score of 7 or less  oxyCODONE    IR 5 milliGRAM(s) Oral every 4 hours PRN Moderate Pain (4 - 6)      I&O's Summary      PHYSICAL EXAM  GENERAL: NAD, well-developed, poor  HEAD:  Atraumatic, Normocephalic  EYES: conjunctiva and sclera clear  NECK: Supple, No JVD  CHEST/LUNG: Clear to auscultation bilaterally; No wheeze or crackles  HEART: Regular rate and rhythm; No murmurs, rubs, or gallops  ABDOMEN: Soft, epigastrium tender, distended; Bowel sounds present  EXTREMITIES:  2+ Peripheral Pulses, No clubbing, cyanosis, or edema  PSYCH: AAOx3  SKIN: No rashes or lesions    LABS:                        16.2   9.18  )-----------( 228      ( 22 Dec 2017 21:30 )             49.1     12-22    141  |  101  |  16  ----------------------------<  106<H>  4.7   |  22  |  1.08    Ca    8.3<L>      22 Dec 2017 21:30    TPro  7.0  /  Alb  3.9  /  TBili  0.3  /  DBili  x   /  AST  36  /  ALT  31  /  AlkPhos  78  12-22    PT/INR - ( 22 Dec 2017 22:40 )   PT: 11.7 SEC;   INR: 1.05            RADIOLOGY & ADDITIONAL TESTS:    Imaging Personally Reviewed:  Consultant(s) Notes Reviewed:    Care Discussed with Consultants/Other Providers:

## 2017-12-23 NOTE — H&P ADULT - NSHPREVIEWOFSYSTEMS_GEN_ALL_CORE
REVIEW OF SYSTEMS    General:  Negative  Skin/Breast:  Negative  Ophthalmologic:  Negative  ENMT:  Negative  Respiratory and Thorax:  Negative  Cardiovascular:  Negative  Gastrointestinal:  pain and n/v as described above  Genitourinary:  Negative  Musculoskeletal:  Negative  Neurological:  Negative  Psychiatric:  stress 2/2 daughter's suicide attempt  Hematology/Lymphatics:  Negative	  Endocrine:	  Negative  Allergic/Immunologic:	 Negative

## 2017-12-23 NOTE — H&P ADULT - PROBLEM SELECTOR PLAN 2
Presently no signs of withdrawal.  Unclear if pt is downplaying the amount of alcohol consumed.    - will monitor with CIWA protocol with Ativan PRN

## 2017-12-23 NOTE — H&P ADULT - HISTORY OF PRESENT ILLNESS
58 y/o M with history of ETOH abuse c/b pancreatitis p/w abd pain x 1 day.  Pt reports experiencing pain severe, sharp pain in epigastrium starting this afternoon.  Pain was associated with nasuea, retching, and vomiting.  No radiation.  Pt reports abstaining from ETOH for the past few months, but relapsed this am 2/2 stress (daughter attempted suicide.) He states he drank 1 shot of irene and "2 nips of vodka."     In the ED, pt had mild elevation of Lipase.  He was given NS, D	ilaudid, and Zofran.

## 2017-12-24 ENCOUNTER — TRANSCRIPTION ENCOUNTER (OUTPATIENT)
Age: 57
End: 2017-12-24

## 2017-12-24 VITALS
DIASTOLIC BLOOD PRESSURE: 82 MMHG | SYSTOLIC BLOOD PRESSURE: 134 MMHG | OXYGEN SATURATION: 89 % | TEMPERATURE: 99 F | HEART RATE: 89 BPM | RESPIRATION RATE: 18 BRPM

## 2017-12-24 LAB
ALBUMIN SERPL ELPH-MCNC: 3.7 G/DL — SIGNIFICANT CHANGE UP (ref 3.3–5)
ALP SERPL-CCNC: 80 U/L — SIGNIFICANT CHANGE UP (ref 40–120)
ALT FLD-CCNC: 28 U/L — SIGNIFICANT CHANGE UP (ref 4–41)
AMYLASE P1 CFR SERPL: 108 U/L — SIGNIFICANT CHANGE UP (ref 25–125)
APTT BLD: 27.8 SEC — SIGNIFICANT CHANGE UP (ref 27.5–37.4)
AST SERPL-CCNC: 37 U/L — SIGNIFICANT CHANGE UP (ref 4–40)
BASOPHILS # BLD AUTO: 0.03 K/UL — SIGNIFICANT CHANGE UP (ref 0–0.2)
BASOPHILS NFR BLD AUTO: 0.4 % — SIGNIFICANT CHANGE UP (ref 0–2)
BILIRUB SERPL-MCNC: 1.1 MG/DL — SIGNIFICANT CHANGE UP (ref 0.2–1.2)
BUN SERPL-MCNC: 7 MG/DL — SIGNIFICANT CHANGE UP (ref 7–23)
CALCIUM SERPL-MCNC: 8.4 MG/DL — SIGNIFICANT CHANGE UP (ref 8.4–10.5)
CHLORIDE SERPL-SCNC: 101 MMOL/L — SIGNIFICANT CHANGE UP (ref 98–107)
CO2 SERPL-SCNC: 22 MMOL/L — SIGNIFICANT CHANGE UP (ref 22–31)
CREAT SERPL-MCNC: 0.89 MG/DL — SIGNIFICANT CHANGE UP (ref 0.5–1.3)
EOSINOPHIL # BLD AUTO: 0.17 K/UL — SIGNIFICANT CHANGE UP (ref 0–0.5)
EOSINOPHIL NFR BLD AUTO: 2.5 % — SIGNIFICANT CHANGE UP (ref 0–6)
GLUCOSE SERPL-MCNC: 102 MG/DL — HIGH (ref 70–99)
HCT VFR BLD CALC: 42.7 % — SIGNIFICANT CHANGE UP (ref 39–50)
HGB BLD-MCNC: 14.1 G/DL — SIGNIFICANT CHANGE UP (ref 13–17)
IMM GRANULOCYTES # BLD AUTO: 0.02 # — SIGNIFICANT CHANGE UP
IMM GRANULOCYTES NFR BLD AUTO: 0.3 % — SIGNIFICANT CHANGE UP (ref 0–1.5)
INR BLD: 1.03 — SIGNIFICANT CHANGE UP (ref 0.88–1.17)
LIDOCAIN IGE QN: 64.5 U/L — HIGH (ref 7–60)
LYMPHOCYTES # BLD AUTO: 1.2 K/UL — SIGNIFICANT CHANGE UP (ref 1–3.3)
LYMPHOCYTES # BLD AUTO: 17.3 % — SIGNIFICANT CHANGE UP (ref 13–44)
MAGNESIUM SERPL-MCNC: 2 MG/DL — SIGNIFICANT CHANGE UP (ref 1.6–2.6)
MCHC RBC-ENTMCNC: 29.2 PG — SIGNIFICANT CHANGE UP (ref 27–34)
MCHC RBC-ENTMCNC: 33 % — SIGNIFICANT CHANGE UP (ref 32–36)
MCV RBC AUTO: 88.4 FL — SIGNIFICANT CHANGE UP (ref 80–100)
MONOCYTES # BLD AUTO: 0.62 K/UL — SIGNIFICANT CHANGE UP (ref 0–0.9)
MONOCYTES NFR BLD AUTO: 8.9 % — SIGNIFICANT CHANGE UP (ref 2–14)
NEUTROPHILS # BLD AUTO: 4.89 K/UL — SIGNIFICANT CHANGE UP (ref 1.8–7.4)
NEUTROPHILS NFR BLD AUTO: 70.6 % — SIGNIFICANT CHANGE UP (ref 43–77)
NRBC # FLD: 0 — SIGNIFICANT CHANGE UP
PHOSPHATE SERPL-MCNC: 3.5 MG/DL — SIGNIFICANT CHANGE UP (ref 2.5–4.5)
PLATELET # BLD AUTO: 210 K/UL — SIGNIFICANT CHANGE UP (ref 150–400)
PMV BLD: 9.2 FL — SIGNIFICANT CHANGE UP (ref 7–13)
POTASSIUM SERPL-MCNC: 3.8 MMOL/L — SIGNIFICANT CHANGE UP (ref 3.5–5.3)
POTASSIUM SERPL-SCNC: 3.8 MMOL/L — SIGNIFICANT CHANGE UP (ref 3.5–5.3)
PROT SERPL-MCNC: 6.2 G/DL — SIGNIFICANT CHANGE UP (ref 6–8.3)
PROTHROM AB SERPL-ACNC: 11.4 SEC — SIGNIFICANT CHANGE UP (ref 9.8–13.1)
RBC # BLD: 4.83 M/UL — SIGNIFICANT CHANGE UP (ref 4.2–5.8)
RBC # FLD: 13.3 % — SIGNIFICANT CHANGE UP (ref 10.3–14.5)
SODIUM SERPL-SCNC: 137 MMOL/L — SIGNIFICANT CHANGE UP (ref 135–145)
WBC # BLD: 6.93 K/UL — SIGNIFICANT CHANGE UP (ref 3.8–10.5)
WBC # FLD AUTO: 6.93 K/UL — SIGNIFICANT CHANGE UP (ref 3.8–10.5)

## 2017-12-24 PROCEDURE — 99239 HOSP IP/OBS DSCHRG MGMT >30: CPT

## 2017-12-24 RX ORDER — OXYCODONE HYDROCHLORIDE 5 MG/1
15 TABLET ORAL EVERY 12 HOURS
Qty: 0 | Refills: 0 | Status: DISCONTINUED | OUTPATIENT
Start: 2017-12-24 | End: 2017-12-24

## 2017-12-24 RX ADMIN — SODIUM CHLORIDE 175 MILLILITER(S): 9 INJECTION INTRAMUSCULAR; INTRAVENOUS; SUBCUTANEOUS at 03:32

## 2017-12-24 RX ADMIN — HYDROMORPHONE HYDROCHLORIDE 1 MILLIGRAM(S): 2 INJECTION INTRAMUSCULAR; INTRAVENOUS; SUBCUTANEOUS at 03:48

## 2017-12-24 RX ADMIN — HEPARIN SODIUM 5000 UNIT(S): 5000 INJECTION INTRAVENOUS; SUBCUTANEOUS at 05:10

## 2017-12-24 RX ADMIN — HYDROMORPHONE HYDROCHLORIDE 1 MILLIGRAM(S): 2 INJECTION INTRAMUSCULAR; INTRAVENOUS; SUBCUTANEOUS at 03:33

## 2017-12-24 NOTE — DISCHARGE NOTE ADULT - CARE PROVIDER_API CALL
Terry Hernandes), Gastroenterology; Internal Medicine  [address] [place] [id number]  Phone: [phone]  Fax: [phone]

## 2017-12-24 NOTE — PROGRESS NOTE ADULT - ASSESSMENT
58 y/o M w/ hx chronic alcoholic pancreatitis, pseudocyst, splenic thrombosis, ?PE, HLD, p/w abd pain and n/v 2/2 acute on chronic pancreatitis in context of alcohol relapse.
56 y/o M w/ hx chronic alcoholic pancreatitis, pseudocyst, splenic thrombosis, ?PE, HLD, p/w abd pain and n/v 2/2 acute on chronic pancreatitis in context of alcohol relapse.

## 2017-12-24 NOTE — DISCHARGE NOTE ADULT - INSTRUCTIONS
Please resume your normal diet as you are able. You may want to start with soft, bland foods and slowly work up to your normal diet. There are no restrictions on fluid intake. You should try to stay well-hydrated and drink lots of water. It is strongly advised that you avoid all alcohol consumption. You may resume your normal level of activity as you are able.

## 2017-12-24 NOTE — PROGRESS NOTE ADULT - SUBJECTIVE AND OBJECTIVE BOX
Medicine Team 4 Accept Note  RAOUL Meehan MD, PGY1  Pager: [phone]      Patient is a 57y old  Male who presents with a chief complaint of abdominal pain (23 Dec 2017 05:43)        SUBJECTIVE / OVERNIGHT EVENTS:        Vital Signs Last 24 Hrs  T(C): 37.1 (24 Dec 2017 05:37), Max: 37.1 (23 Dec 2017 18:20)  T(F): 98.7 (24 Dec 2017 05:37), Max: 98.7 (23 Dec 2017 18:20)  HR: 89 (24 Dec 2017 05:37) (86 - 100)  BP: 134/82 (24 Dec 2017 05:37) (134/82 - 148/85)  BP(mean): --  RR: 18 (24 Dec 2017 05:37) (16 - 18)  SpO2: 89% (24 Dec 2017 05:37) (89% - 100%)      MEDICATIONS  (STANDING):  heparin  Injectable 5000 Unit(s) SubCutaneous every 8 hours  influenza   Vaccine 0.5 milliLiter(s) IntraMuscular once  sodium chloride 0.9%. 1000 milliLiter(s) (175 mL/Hr) IV Continuous <Continuous>    MEDICATIONS  (PRN):  HYDROmorphone  Injectable 1 milliGRAM(s) IV Push every 4 hours PRN Severe Pain (7 - 10)  LORazepam     Tablet 2 milliGRAM(s) Oral every 2 hours PRN CIWA-Ar score increase by 2 points and a total score of 7 or less  oxyCODONE    IR 5 milliGRAM(s) Oral every 4 hours PRN Moderate Pain (4 - 6)  simethicone 80 milliGRAM(s) Chew every 6 hours PRN Gas      CAPILLARY BLOOD GLUCOSE      I&O's Summary    PHYSICAL EXAM  GENERAL: NAD, well-developed, poor  HEAD:  Atraumatic, Normocephalic  EYES: conjunctiva and sclera clear  NECK: Supple, No JVD  CHEST/LUNG: Clear to auscultation bilaterally; No wheeze or crackles  HEART: Regular rate and rhythm; No murmurs, rubs, or gallops  ABDOMEN: Soft, epigastrium tender, distended; Bowel sounds present  EXTREMITIES:  2+ Peripheral Pulses, No clubbing, cyanosis, or edema  PSYCH: AAOx3  SKIN: No rashes or lesions      LABS:                        16.2   9.18  )-----------( 228      ( 22 Dec 2017 21:30 )             49.1     12-22    141  |  101  |  16  ----------------------------<  106<H>  4.7   |  22  |  1.08    Ca    8.3<L>      22 Dec 2017 21:30    TPro  7.0  /  Alb  3.9  /  TBili  0.3  /  DBili  x   /  AST  36  /  ALT  31  /  AlkPhos  78  12-22    PT/INR - ( 22 Dec 2017 22:40 )   PT: 11.7 SEC;   INR: 1.05                RADIOLOGY & ADDITIONAL TESTS:    Imaging Personally Reviewed:  Consultant(s) Notes Reviewed:    Care Discussed with Consultants/Other Providers: Medicine Team 4 Accept Note  RAOUL Meehan MD, PGY1  Pager: [phone]      Patient is a 57y old  Male who presents with a chief complaint of abdominal pain (23 Dec 2017 05:43)      SUBJECTIVE / OVERNIGHT EVENTS:   No acute overnight events. Pt states he did not sleep well but is feeling much better. States his abdominal pain is improved, he is passing gas, and had a BM this AM (partially soft/ partially hard). States he is peeing well and ambulating. Pt reports that he is still having a lot of difficulty with PO intake as it causes significant pain but he is less nauseous and not vomiting. States that he was only able to tolerate sips of water/ broth yesterday but will attempt to eat soft breakfast.       Vital Signs Last 24 Hrs  T(C): 37.1 (24 Dec 2017 05:37), Max: 37.1 (23 Dec 2017 18:20)  T(F): 98.7 (24 Dec 2017 05:37), Max: 98.7 (23 Dec 2017 18:20)  HR: 89 (24 Dec 2017 05:37) (86 - 100)  BP: 134/82 (24 Dec 2017 05:37) (134/82 - 148/85)  BP(mean): --  RR: 18 (24 Dec 2017 05:37) (16 - 18)  SpO2: 89% (24 Dec 2017 05:37) (89% - 100%)      MEDICATIONS  (STANDING):  heparin  Injectable 5000 Unit(s) SubCutaneous every 8 hours  influenza   Vaccine 0.5 milliLiter(s) IntraMuscular once  sodium chloride 0.9%. 1000 milliLiter(s) (175 mL/Hr) IV Continuous <Continuous>    MEDICATIONS  (PRN):  HYDROmorphone  Injectable 1 milliGRAM(s) IV Push every 4 hours PRN Severe Pain (7 - 10)  LORazepam     Tablet 2 milliGRAM(s) Oral every 2 hours PRN CIWA-Ar score increase by 2 points and a total score of 7 or less  oxyCODONE    IR 5 milliGRAM(s) Oral every 4 hours PRN Moderate Pain (4 - 6)  simethicone 80 milliGRAM(s) Chew every 6 hours PRN Gas      CAPILLARY BLOOD GLUCOSE      I&O's Summary    PHYSICAL EXAM  GENERAL: NAD, well-developed, adequate hygiene   HEAD:  Atraumatic, Normocephalic, poor dentition  EYES: conjunctiva and sclera clear  NECK: Supple, No JVD  CHEST/LUNG: Clear to auscultation bilaterally; No wheeze or crackles  HEART: Regular rate and rhythm; No murmurs, rubs, or gallops  ABDOMEN: Soft, epigastrium tender, distended; Bowel sounds present  EXTREMITIES:  2+ Peripheral Pulses, No clubbing, cyanosis, or edema  PSYCH: AAOx3  SKIN: No rashes or lesions      LABS:                        16.2   9.18  )-----------( 228      ( 22 Dec 2017 21:30 )             49.1     12-22    141  |  101  |  16  ----------------------------<  106<H>  4.7   |  22  |  1.08    Ca    8.3<L>      22 Dec 2017 21:30    TPro  7.0  /  Alb  3.9  /  TBili  0.3  /  DBili  x   /  AST  36  /  ALT  31  /  AlkPhos  78  12-22    PT/INR - ( 22 Dec 2017 22:40 )   PT: 11.7 SEC;   INR: 1.05            RADIOLOGY & ADDITIONAL TESTS:    Imaging Personally Reviewed:  Consultant(s) Notes Reviewed:    Care Discussed with Consultants/Other Providers:

## 2017-12-24 NOTE — DISCHARGE NOTE ADULT - CARE PLAN
Principal Discharge DX:	Pancreatitis  Goal:	You were admitted with pancreatitis and associated pain, nausea, and vomiting. Your pain will be improved, your labs will be stable, and you will be tolerating eating and drinking in order for you to be discharged from the hosptial.  Instructions for follow-up, activity and diet:	You should follow-up with your GI doctor and PMD after discharge. You were not placed on any new medications other than for symptom control (pain, nausea, etc.). When you came into the hospital your lipase (a blood test that indicates how severe your pancreatitis is) was 95.6 and by discharge it had come down to 64.5, indicating that your pancreatitis was resolving. Your Amylase, which also reflects your level of pancreas injury, was 184 and had dropped down to normal range at discharge and was 108. Your outpatient doctor may want to repeat these labs.  Secondary Diagnosis:	AYAN (acute kidney injury)  Instructions for follow-up, activity and diet:	When you came into the hospital, your creatinine, which measures your kidney function, was slightly elevated at 1.31 indicating an acute injury to your kidney (AYAN). The higher the creatinine, the greater the level of kidney injury. This was likely because of nausea, vomiting, and eating less due to your acute pancreatitis. After you received IV fluids for your pancreatitis and were more properly hydrated your creatinine returned to normal, coming down to 0.89 at discharge, which is within normal limits.  Secondary Diagnosis:	Uncomplicated alcohol dependence  Instructions for follow-up, activity and diet:	Your pancreatitis likely worsened because of your alcohol relapse. It is recommended that you refrain from drinking any alcohol, as this could cause another acute flair-up of your pancreatitis. If you need assistance doing so, you can join a local AA group Principal Discharge DX:	Pancreatitis  Goal:	You were admitted with pancreatitis and associated pain, nausea, and vomiting. Your pain will be improved, your labs will be stable, and you will be tolerating eating and drinking in order for you to be discharged from the hosptial.  Instructions for follow-up, activity and diet:	You should follow-up with your GI doctor and PMD after discharge. You were not placed on any new medications other than for symptom control (pain, nausea, etc.). When you came into the hospital your lipase (a blood test that indicates how severe your pancreatitis is) was 95.6 and by discharge it had come down to 64.5, indicating that your pancreatitis was resolving. Your Amylase, which also reflects your level of pancreas injury, was 184 and had dropped down to normal range at discharge and was 108. Your outpatient doctor may want to repeat these labs.  Secondary Diagnosis:	AYAN (acute kidney injury)  Instructions for follow-up, activity and diet:	When you came into the hospital, your creatinine, which measures your kidney function, was slightly elevated at 1.31 indicating an acute injury to your kidney (AYAN). The higher the creatinine, the greater the level of kidney injury. This was likely because of nausea, vomiting, and eating less due to your acute pancreatitis. After you received IV fluids for your pancreatitis and were more properly hydrated your creatinine returned to normal, coming down to 0.89 at discharge, which is within normal limits.  Secondary Diagnosis:	Uncomplicated alcohol dependence  Instructions for follow-up, activity and diet:	Your pancreatitis likely worsened because of your alcohol relapse. It is recommended that you refrain from drinking any alcohol, as this could cause another acute flair-up of your pancreatitis. If you need assistance doing so, you can join a local AA group or call [phone] which is the [facility] substance abuse clinic in order to seek help with alcohol abstinence. Principal Discharge DX:	Pancreatitis  Goal:	tolerate eating and pain control  Instructions for follow-up, activity and diet:	You should follow-up with your GI doctor and PMD after discharge. You were not placed on any new medications other than for symptom control (pain, nausea, etc.). Your pain improved and you were able to eat a normal meal  Secondary Diagnosis:	AYAN (acute kidney injury)  Instructions for follow-up, activity and diet:	When you came into the hospital, your creatinine, which measures your kidney function, was slightly elevated at 1.31 indicating an acute injury to your kidney (AYAN). This normalized after some hydration.  Secondary Diagnosis:	Uncomplicated alcohol dependence  Instructions for follow-up, activity and diet:	Your pancreatitis likely worsened because of your alcohol relapse. It is recommended that you refrain from drinking any alcohol, as this could cause another acute flair-up of your pancreatitis. If you need assistance doing so, you can join a local AA group or call [phone] which is the [facility] substance abuse clinic in order to seek help with alcohol abstinence.

## 2017-12-24 NOTE — PROGRESS NOTE ADULT - PROBLEM SELECTOR PLAN 1
- Hx chronic pancreatitis, psydocyst s/p cyst gastrostomy in 2012  - Adm with 10/10 epigastric pain similar to previous episodes of pancreatitis   - On Adm: lipase 95.6, amylase 184  - simethicone PRN for gas   - No fever, HD stable, no leukocytosis  - likely acute exacerbation of chronic pancreatitis 2/2 alcohol consumption (relapse)  - pain control w/ PRN Dilaudid 1 mg for severe pain + oxycodone 5 mg for moderate pain, taking w/ less frequency, switching to PO today  - Advancing diet as tolerated, now on soft diet, still w/ significant pain w/ eating  - PRN zofran for nausea/ vomiting  - repeat BMP
- Hx chronic pancreatitis, psydocyst s/p cyst gastrostomy in 2012  - Adm with 10/10 epigastric pain similar to previous episodes of pancreatitis   - On Adm: lipase 95.6, amylase 184  - simethicone PRN for gas   - No fever, HD stable, no leukocytosis  - likely acute exacerbation of chronic pancreatitis 2/2 alcohol consumption (relapse)  - pain control w/ PRN Dilaudid 1 mg for severe pain + oxycodone 5 mg for moderate pain  - Advancing diet as tolerated, now on clears, still w/ significant pain w/ eating  - PRN zofran for nausea/ vomiting  - repeat BMP

## 2017-12-24 NOTE — PROGRESS NOTE ADULT - PROBLEM SELECTOR PLAN 3
- Cr 1.31 on admission likely 2/2 decr PO intake, nausea, vomiting  - now resolved after IVF, most recently 1.08
- Cr 1.31 on admission likely 2/2 decr PO intake, nausea, vomiting  - now resolved after IVF

## 2017-12-24 NOTE — DISCHARGE NOTE ADULT - PLAN OF CARE
You were admitted with pancreatitis and associated pain, nausea, and vomiting. Your pain will be improved, your labs will be stable, and you will be tolerating eating and drinking in order for you to be discharged from the hosptial. You should follow-up with your GI doctor and PMD after discharge. You were not placed on any new medications other than for symptom control (pain, nausea, etc.). When you came into the hospital your lipase (a blood test that indicates how severe your pancreatitis is) was 95.6 and by discharge it had come down to 64.5, indicating that your pancreatitis was resolving. Your Amylase, which also reflects your level of pancreas injury, was 184 and had dropped down to normal range at discharge and was 108. Your outpatient doctor may want to repeat these labs. When you came into the hospital, your creatinine, which measures your kidney function, was slightly elevated at 1.31 indicating an acute injury to your kidney (AYAN). The higher the creatinine, the greater the level of kidney injury. This was likely because of nausea, vomiting, and eating less due to your acute pancreatitis. After you received IV fluids for your pancreatitis and were more properly hydrated your creatinine returned to normal, coming down to 0.89 at discharge, which is within normal limits. Your pancreatitis likely worsened because of your alcohol relapse. It is recommended that you refrain from drinking any alcohol, as this could cause another acute flair-up of your pancreatitis. If you need assistance doing so, you can join a local AA group Your pancreatitis likely worsened because of your alcohol relapse. It is recommended that you refrain from drinking any alcohol, as this could cause another acute flair-up of your pancreatitis. If you need assistance doing so, you can join a local AA group or call [phone] which is the [facility] substance abuse clinic in order to seek help with alcohol abstinence. tolerate eating and pain control You should follow-up with your GI doctor and PMD after discharge. You were not placed on any new medications other than for symptom control (pain, nausea, etc.). Your pain improved and you were able to eat a normal meal When you came into the hospital, your creatinine, which measures your kidney function, was slightly elevated at 1.31 indicating an acute injury to your kidney (AYAN). This normalized after some hydration.

## 2017-12-24 NOTE — PROGRESS NOTE ADULT - PROBLEM SELECTOR PLAN 2
- CIWA protocol Q4 hr: scores have been 0 in last day/ night  - Ativan PRN for CIWA >7 or incr 2 or more  - SW consult for alcohol dependence   - multiple prev rehabs stays, currently sober per pt w/ exception of relapse  - thiamine, B12, folate supplementation
- CIWA protocol Q4 hr   - Ativan PRN for CIWA >7 or incr 2 or more  - SW consult for alcohol dependence   - multiple prev rehabs stays, currently sober per pt w/ exception of relapse  - thiamine, B12, folate

## 2017-12-24 NOTE — DISCHARGE NOTE ADULT - PATIENT PORTAL LINK FT
“You can access the [facility] Patient Portal, offered by [facility], by registering with the following website: [email or web address]”

## 2017-12-24 NOTE — PROGRESS NOTE ADULT - ATTENDING COMMENTS
Patient was seen and examined personally by me. I have discussed the plan and reviewed the resident's note and agree with the above physical exam findings including assessment and plan except as indicated below.    57 male with acute on chronic pancreatitis complicated by AYAN and reactive leukocytosis.    Patient seen dressed and ambulating without difficulty on room air.   Documented sat of 89% was error. RN made aware.  Tolerated regular diet and pain is improved.  No PRN ativan has been required  AYAN and lactate resolved    Dispo: stable for DC home. DC time: 35 min
Patient was seen and examined personally by me. I have discussed the plan and reviewed the resident's note and agree with the above physical exam findings including assessment and plan except as indicated below.    57 male with acute on chronic pancreatitis complicated by AYAN and reactive leukocytosis.    CLQ diet, advance diet as tolerated  Pain control PRN  Add simethicone  CIWA protocol with PRN Ativan  Increase IVF hydration, Repeat lactate  AYAN resolved    Dispo: DC once pain improved and tolerating PO diet

## 2018-01-03 ENCOUNTER — OUTPATIENT (OUTPATIENT)
Dept: OUTPATIENT SERVICES | Facility: HOSPITAL | Age: 58
LOS: 1 days | End: 2018-01-03

## 2018-01-03 ENCOUNTER — APPOINTMENT (OUTPATIENT)
Dept: INTERNAL MEDICINE | Facility: HOSPITAL | Age: 58
End: 2018-01-03
Payer: MEDICAID

## 2018-01-03 ENCOUNTER — LABORATORY RESULT (OUTPATIENT)
Age: 58
End: 2018-01-03

## 2018-01-03 VITALS — HEIGHT: 69 IN | WEIGHT: 166 LBS | BODY MASS INDEX: 24.59 KG/M2

## 2018-01-03 VITALS — SYSTOLIC BLOOD PRESSURE: 138 MMHG | DIASTOLIC BLOOD PRESSURE: 82 MMHG | HEART RATE: 79 BPM

## 2018-01-03 DIAGNOSIS — F10.230 ALCOHOL DEPENDENCE WITH WITHDRAWAL, UNCOMPLICATED: ICD-10-CM

## 2018-01-03 DIAGNOSIS — Z98.89 OTHER SPECIFIED POSTPROCEDURAL STATES: Chronic | ICD-10-CM

## 2018-01-03 DIAGNOSIS — E78.5 HYPERLIPIDEMIA, UNSPECIFIED: ICD-10-CM

## 2018-01-03 DIAGNOSIS — K86.0 ALCOHOL-INDUCED CHRONIC PANCREATITIS: ICD-10-CM

## 2018-01-03 DIAGNOSIS — R73.03 PREDIABETES.: ICD-10-CM

## 2018-01-03 LAB — HBA1C BLD-MCNC: 6.4 % — HIGH (ref 4–5.6)

## 2018-01-03 PROCEDURE — 99213 OFFICE O/P EST LOW 20 MIN: CPT | Mod: GE

## 2018-01-04 ENCOUNTER — RESULT REVIEW (OUTPATIENT)
Age: 58
End: 2018-01-04

## 2018-01-09 DIAGNOSIS — K86.0 ALCOHOL-INDUCED CHRONIC PANCREATITIS: ICD-10-CM

## 2018-01-09 DIAGNOSIS — R73.03 PREDIABETES: ICD-10-CM

## 2018-01-09 DIAGNOSIS — E78.5 HYPERLIPIDEMIA, UNSPECIFIED: ICD-10-CM

## 2018-01-09 DIAGNOSIS — F10.230 ALCOHOL DEPENDENCE WITH WITHDRAWAL, UNCOMPLICATED: ICD-10-CM

## 2018-04-20 ENCOUNTER — APPOINTMENT (OUTPATIENT)
Dept: INTERNAL MEDICINE | Facility: HOSPITAL | Age: 58
End: 2018-04-20

## 2018-09-14 NOTE — DISCHARGE NOTE ADULT - CONDITION (STATED IN TERMS THAT PERMIT A SPECIFIC MEASURABLE COMPARISON WITH CONDITION ON ADMISSION):
Pt to ED with SOB for the past 2 days. PT stated has been unable to urinate also. Pt stated history of CHF and on a water pill. EKG completed. Improved

## 2018-09-27 ENCOUNTER — EMERGENCY (EMERGENCY)
Facility: HOSPITAL | Age: 58
LOS: 1 days | Discharge: HOME | End: 2018-09-27
Attending: EMERGENCY MEDICINE | Admitting: EMERGENCY MEDICINE
Payer: MEDICAID

## 2018-09-27 VITALS
RESPIRATION RATE: 18 BRPM | TEMPERATURE: 98 F | HEART RATE: 125 BPM | OXYGEN SATURATION: 99 % | DIASTOLIC BLOOD PRESSURE: 107 MMHG | SYSTOLIC BLOOD PRESSURE: 150 MMHG

## 2018-09-27 DIAGNOSIS — Z98.89 OTHER SPECIFIED POSTPROCEDURAL STATES: Chronic | ICD-10-CM

## 2018-09-27 LAB
ALBUMIN SERPL ELPH-MCNC: 4.2 G/DL — SIGNIFICANT CHANGE UP (ref 3.3–5)
ALP SERPL-CCNC: 122 U/L — HIGH (ref 40–120)
ALT FLD-CCNC: 47 U/L — HIGH (ref 4–41)
APPEARANCE UR: CLEAR — SIGNIFICANT CHANGE UP
AST SERPL-CCNC: 52 U/L — HIGH (ref 4–40)
BACTERIA # UR AUTO: NEGATIVE — SIGNIFICANT CHANGE UP
BASE EXCESS BLDV CALC-SCNC: -0.6 MMOL/L — SIGNIFICANT CHANGE UP
BASOPHILS # BLD AUTO: 0.08 K/UL — SIGNIFICANT CHANGE UP (ref 0–0.2)
BASOPHILS NFR BLD AUTO: 0.5 % — SIGNIFICANT CHANGE UP (ref 0–2)
BILIRUB DIRECT SERPL-MCNC: 0.5 MG/DL — HIGH (ref 0.1–0.2)
BILIRUB SERPL-MCNC: 1.6 MG/DL — HIGH (ref 0.2–1.2)
BILIRUB SERPL-MCNC: 2 MG/DL — HIGH (ref 0.2–1.2)
BILIRUB UR-MCNC: NEGATIVE — SIGNIFICANT CHANGE UP
BLOOD GAS VENOUS - CREATININE: SIGNIFICANT CHANGE UP MG/DL (ref 0.5–1.3)
BLOOD UR QL VISUAL: NEGATIVE — SIGNIFICANT CHANGE UP
BUN SERPL-MCNC: 17 MG/DL — SIGNIFICANT CHANGE UP (ref 7–23)
CALCIUM SERPL-MCNC: 9.3 MG/DL — SIGNIFICANT CHANGE UP (ref 8.4–10.5)
CHLORIDE BLDV-SCNC: 108 MMOL/L — SIGNIFICANT CHANGE UP (ref 96–108)
CHLORIDE SERPL-SCNC: 96 MMOL/L — LOW (ref 98–107)
CO2 SERPL-SCNC: 17 MMOL/L — LOW (ref 22–31)
COLOR SPEC: YELLOW — SIGNIFICANT CHANGE UP
CREAT SERPL-MCNC: 1.16 MG/DL — SIGNIFICANT CHANGE UP (ref 0.5–1.3)
EOSINOPHIL # BLD AUTO: 0.07 K/UL — SIGNIFICANT CHANGE UP (ref 0–0.5)
EOSINOPHIL NFR BLD AUTO: 0.5 % — SIGNIFICANT CHANGE UP (ref 0–6)
GAS PNL BLDV: 134 MMOL/L — LOW (ref 136–146)
GLUCOSE BLDV-MCNC: 109 — HIGH (ref 70–99)
GLUCOSE SERPL-MCNC: 112 MG/DL — HIGH (ref 70–99)
GLUCOSE UR-MCNC: NEGATIVE — SIGNIFICANT CHANGE UP
HCO3 BLDV-SCNC: 23 MMOL/L — SIGNIFICANT CHANGE UP (ref 20–27)
HCT VFR BLD CALC: 50.8 % — HIGH (ref 39–50)
HCT VFR BLDV CALC: 50.6 % — SIGNIFICANT CHANGE UP (ref 39–51)
HGB BLD-MCNC: 16.8 G/DL — SIGNIFICANT CHANGE UP (ref 13–17)
HGB BLDV-MCNC: 16.5 G/DL — SIGNIFICANT CHANGE UP (ref 13–17)
HYALINE CASTS # UR AUTO: SIGNIFICANT CHANGE UP
IMM GRANULOCYTES # BLD AUTO: 0.07 # — SIGNIFICANT CHANGE UP
IMM GRANULOCYTES NFR BLD AUTO: 0.5 % — SIGNIFICANT CHANGE UP (ref 0–1.5)
KETONES UR-MCNC: SIGNIFICANT CHANGE UP
LACTATE BLDV-MCNC: 2 MMOL/L — SIGNIFICANT CHANGE UP (ref 0.5–2)
LEUKOCYTE ESTERASE UR-ACNC: NEGATIVE — SIGNIFICANT CHANGE UP
LIDOCAIN IGE QN: 55.9 U/L — SIGNIFICANT CHANGE UP (ref 7–60)
LYMPHOCYTES # BLD AUTO: 13.3 % — SIGNIFICANT CHANGE UP (ref 13–44)
LYMPHOCYTES # BLD AUTO: 2 K/UL — SIGNIFICANT CHANGE UP (ref 1–3.3)
MCHC RBC-ENTMCNC: 27.6 PG — SIGNIFICANT CHANGE UP (ref 27–34)
MCHC RBC-ENTMCNC: 33.1 % — SIGNIFICANT CHANGE UP (ref 32–36)
MCV RBC AUTO: 83.6 FL — SIGNIFICANT CHANGE UP (ref 80–100)
MONOCYTES # BLD AUTO: 1.14 K/UL — HIGH (ref 0–0.9)
MONOCYTES NFR BLD AUTO: 7.6 % — SIGNIFICANT CHANGE UP (ref 2–14)
NEUTROPHILS # BLD AUTO: 11.73 K/UL — HIGH (ref 1.8–7.4)
NEUTROPHILS NFR BLD AUTO: 77.6 % — HIGH (ref 43–77)
NITRITE UR-MCNC: NEGATIVE — SIGNIFICANT CHANGE UP
NRBC # FLD: 0 — SIGNIFICANT CHANGE UP
PCO2 BLDV: 43 MMHG — SIGNIFICANT CHANGE UP (ref 41–51)
PH BLDV: 7.37 PH — SIGNIFICANT CHANGE UP (ref 7.32–7.43)
PH UR: 6.5 — SIGNIFICANT CHANGE UP (ref 5–8)
PLATELET # BLD AUTO: 270 K/UL — SIGNIFICANT CHANGE UP (ref 150–400)
PMV BLD: 9.4 FL — SIGNIFICANT CHANGE UP (ref 7–13)
PO2 BLDV: 40 MMHG — SIGNIFICANT CHANGE UP (ref 35–40)
POTASSIUM BLDV-SCNC: 3.4 MMOL/L — SIGNIFICANT CHANGE UP (ref 3.4–4.5)
POTASSIUM SERPL-MCNC: 4.4 MMOL/L — SIGNIFICANT CHANGE UP (ref 3.5–5.3)
POTASSIUM SERPL-SCNC: 4.4 MMOL/L — SIGNIFICANT CHANGE UP (ref 3.5–5.3)
PROT SERPL-MCNC: 7.7 G/DL — SIGNIFICANT CHANGE UP (ref 6–8.3)
PROT UR-MCNC: 30 — SIGNIFICANT CHANGE UP
RBC # BLD: 6.08 M/UL — HIGH (ref 4.2–5.8)
RBC # FLD: 14.6 % — HIGH (ref 10.3–14.5)
RBC CASTS # UR COMP ASSIST: SIGNIFICANT CHANGE UP (ref 0–?)
REVIEW TO FOLLOW: YES — SIGNIFICANT CHANGE UP
SAO2 % BLDV: 71.3 % — SIGNIFICANT CHANGE UP (ref 60–85)
SODIUM SERPL-SCNC: 135 MMOL/L — SIGNIFICANT CHANGE UP (ref 135–145)
SP GR SPEC: > 1.05 — HIGH (ref 1–1.04)
SQUAMOUS # UR AUTO: SIGNIFICANT CHANGE UP
UROBILINOGEN FLD QL: NORMAL — SIGNIFICANT CHANGE UP
WBC # BLD: 15.09 K/UL — HIGH (ref 3.8–10.5)
WBC # FLD AUTO: 15.09 K/UL — HIGH (ref 3.8–10.5)
WBC UR QL: SIGNIFICANT CHANGE UP (ref 0–?)

## 2018-09-27 PROCEDURE — 71046 X-RAY EXAM CHEST 2 VIEWS: CPT | Mod: 26

## 2018-09-27 PROCEDURE — 76705 ECHO EXAM OF ABDOMEN: CPT | Mod: 26

## 2018-09-27 PROCEDURE — 99220: CPT

## 2018-09-27 PROCEDURE — 74177 CT ABD & PELVIS W/CONTRAST: CPT | Mod: 26

## 2018-09-27 RX ORDER — ONDANSETRON 8 MG/1
4 TABLET, FILM COATED ORAL ONCE
Qty: 0 | Refills: 0 | Status: COMPLETED | OUTPATIENT
Start: 2018-09-27 | End: 2018-09-27

## 2018-09-27 RX ORDER — KETOROLAC TROMETHAMINE 30 MG/ML
15 SYRINGE (ML) INJECTION ONCE
Qty: 0 | Refills: 0 | Status: DISCONTINUED | OUTPATIENT
Start: 2018-09-27 | End: 2018-09-27

## 2018-09-27 RX ORDER — FAMOTIDINE 10 MG/ML
20 INJECTION INTRAVENOUS ONCE
Qty: 0 | Refills: 0 | Status: COMPLETED | OUTPATIENT
Start: 2018-09-27 | End: 2018-09-27

## 2018-09-27 RX ORDER — PANTOPRAZOLE SODIUM 20 MG/1
40 TABLET, DELAYED RELEASE ORAL ONCE
Qty: 0 | Refills: 0 | Status: COMPLETED | OUTPATIENT
Start: 2018-09-27 | End: 2018-09-27

## 2018-09-27 RX ORDER — SODIUM CHLORIDE 9 MG/ML
1000 INJECTION INTRAMUSCULAR; INTRAVENOUS; SUBCUTANEOUS ONCE
Qty: 0 | Refills: 0 | Status: COMPLETED | OUTPATIENT
Start: 2018-09-27 | End: 2018-09-27

## 2018-09-27 RX ORDER — SODIUM CHLORIDE 9 MG/ML
500 INJECTION INTRAMUSCULAR; INTRAVENOUS; SUBCUTANEOUS ONCE
Qty: 0 | Refills: 0 | Status: COMPLETED | OUTPATIENT
Start: 2018-09-27 | End: 2018-09-27

## 2018-09-27 RX ORDER — BACLOFEN 100 %
10 POWDER (GRAM) MISCELLANEOUS ONCE
Qty: 0 | Refills: 0 | Status: COMPLETED | OUTPATIENT
Start: 2018-09-27 | End: 2018-09-27

## 2018-09-27 RX ORDER — PIPERACILLIN AND TAZOBACTAM 4; .5 G/20ML; G/20ML
3.38 INJECTION, POWDER, LYOPHILIZED, FOR SOLUTION INTRAVENOUS ONCE
Qty: 0 | Refills: 0 | Status: COMPLETED | OUTPATIENT
Start: 2018-09-27 | End: 2018-09-27

## 2018-09-27 RX ADMIN — SODIUM CHLORIDE 500 MILLILITER(S): 9 INJECTION INTRAMUSCULAR; INTRAVENOUS; SUBCUTANEOUS at 20:18

## 2018-09-27 RX ADMIN — SODIUM CHLORIDE 1000 MILLILITER(S): 9 INJECTION INTRAMUSCULAR; INTRAVENOUS; SUBCUTANEOUS at 12:01

## 2018-09-27 RX ADMIN — Medication 15 MILLIGRAM(S): at 16:01

## 2018-09-27 RX ADMIN — Medication 15 MILLIGRAM(S): at 12:38

## 2018-09-27 RX ADMIN — Medication 15 MILLIGRAM(S): at 12:01

## 2018-09-27 RX ADMIN — Medication 10 MILLIGRAM(S): at 12:08

## 2018-09-27 RX ADMIN — PANTOPRAZOLE SODIUM 40 MILLIGRAM(S): 20 TABLET, DELAYED RELEASE ORAL at 12:08

## 2018-09-27 RX ADMIN — ONDANSETRON 4 MILLIGRAM(S): 8 TABLET, FILM COATED ORAL at 12:01

## 2018-09-27 RX ADMIN — FAMOTIDINE 20 MILLIGRAM(S): 10 INJECTION INTRAVENOUS at 22:56

## 2018-09-27 RX ADMIN — PIPERACILLIN AND TAZOBACTAM 200 GRAM(S): 4; .5 INJECTION, POWDER, LYOPHILIZED, FOR SOLUTION INTRAVENOUS at 14:35

## 2018-09-27 RX ADMIN — Medication 15 MILLIGRAM(S): at 20:18

## 2018-09-27 RX ADMIN — SODIUM CHLORIDE 500 MILLILITER(S): 9 INJECTION INTRAMUSCULAR; INTRAVENOUS; SUBCUTANEOUS at 22:56

## 2018-09-27 RX ADMIN — SODIUM CHLORIDE 1000 MILLILITER(S): 9 INJECTION INTRAMUSCULAR; INTRAVENOUS; SUBCUTANEOUS at 14:35

## 2018-09-27 RX ADMIN — ONDANSETRON 4 MILLIGRAM(S): 8 TABLET, FILM COATED ORAL at 22:56

## 2018-09-27 NOTE — ED PROVIDER NOTE - MEDICAL DECISION MAKING DETAILS
58M with hx of etoh abuse, pancreatitis presents with abdominal pain, nausea/vomiting s/p etoh consumption. Will check labs, lipase, RUQ U/S, U/A. Will give IVF, zofran, pain meds. Reassess. Ayers: 58M with hx of etoh abuse, pancreatitis presents with abdominal pain, nausea/vomiting s/p etoh consumption. Will check labs, lipase, RUQ U/S, U/A. Will give IVF, zofran, pain meds. Reassess.

## 2018-09-27 NOTE — ED PROVIDER NOTE - OBJECTIVE STATEMENT
58M with PMHx etoh abuse, pancreatitis (?pancreatic cyst) presents with abdominal pain, nausea/vomiting that began on Tuesday. Patient reports that on Monday he had 1/2 a shot of liquor after not drinking alcohol since December last year. 58M with PMHx etoh abuse, pancreatitis (?pancreatic cyst) presents with abdominal pain, nausea/vomiting that began on Tuesday. Patient reports that on Monday he had 1/2 a shot of liquor after not drinking alcohol since December last year. Patient states the abdominal pain is sharp and worse on the right upper side but goes across the middle of his abdomen as well. He reports some associated decreased PO intake, chills, and burning "chest pain" associated with hiccups/reflux. Patient denies fever, palpitations, headache, dizziness, leg pain/swelling, blood in vomitus, diarrhea, blood in stool, cough, shortness of breath, numbness/tingling, dysuria, hematuria, difficulty urinating.

## 2018-09-27 NOTE — ED ADULT NURSE NOTE - OBJECTIVE STATEMENT
Pt a&ox3 c/o abdominal pain, hiccups, nausea for the past 3 days, denies sob breathing even and unlabored, denies cp/discomfort, denies headache/dizziness, abd soft, non-tender, non-distended, skin is cool dry and intact, ivl placed, labs sent, will continue to monitor.

## 2018-09-27 NOTE — ED PROVIDER NOTE - PROGRESS NOTE DETAILS
MOLLY (Alvaro):  58 M p/w hiccups and epigastric pain.  PPI, baclofen, zofran, labs, RUQ US, IVF. Wagner: Pt signed out o me by DR Ayers pending CTA. CT pending, NP improved without intervention. will continue to reassess. Wagner: Pt signed out o me by DR Ayers pending CTAP. CT pending, NP improved without intervention. will continue to reassess. Audrey: Received sign out on this pt. Dispo pending CT results. Wagner: Pt CT with inguinal hernia but no other cause for pain. Pt given PO trial but unable to tolerate and vomited. will place pt in CDU.

## 2018-09-27 NOTE — ED ADULT NURSE NOTE - NSIMPLEMENTINTERV_GEN_ALL_ED
Implemented All Universal Safety Interventions:  [place] to call system. Call bell, personal items and telephone within reach. Instruct patient to call for assistance. Room bathroom lighting operational. Non-slip footwear when patient is off stretcher. Physically safe environment: no spills, clutter or unnecessary equipment. Stretcher in lowest position, wheels locked, appropriate side rails in place.

## 2018-09-27 NOTE — ED ADULT NURSE REASSESSMENT NOTE - NS ED NURSE REASSESS COMMENT FT1
covering primary RN for break pt is in bed  A and O x 3 ni NAD  did not tolerate PO  food. pending further orders.

## 2018-09-27 NOTE — ED ADULT TRIAGE NOTE - CHIEF COMPLAINT QUOTE
pt c/o hiccups, epigastric and  abd pain with n/v x 3 days. states symptoms began after drinking a shot of vodka on monday. hx of pancreatitis. hiccups resolved in triage. pt vomiting in triage,.

## 2018-09-28 VITALS
SYSTOLIC BLOOD PRESSURE: 141 MMHG | DIASTOLIC BLOOD PRESSURE: 81 MMHG | TEMPERATURE: 98 F | HEART RATE: 96 BPM | OXYGEN SATURATION: 100 % | RESPIRATION RATE: 14 BRPM

## 2018-09-28 LAB
ALBUMIN SERPL ELPH-MCNC: 3.5 G/DL — SIGNIFICANT CHANGE UP (ref 3.3–5)
ALP SERPL-CCNC: 93 U/L — SIGNIFICANT CHANGE UP (ref 40–120)
ALT FLD-CCNC: 33 U/L — SIGNIFICANT CHANGE UP (ref 4–41)
AST SERPL-CCNC: 33 U/L — SIGNIFICANT CHANGE UP (ref 4–40)
BASOPHILS # BLD AUTO: 0.04 K/UL — SIGNIFICANT CHANGE UP (ref 0–0.2)
BASOPHILS NFR BLD AUTO: 0.5 % — SIGNIFICANT CHANGE UP (ref 0–2)
BILIRUB SERPL-MCNC: 1.7 MG/DL — HIGH (ref 0.2–1.2)
BUN SERPL-MCNC: 14 MG/DL — SIGNIFICANT CHANGE UP (ref 7–23)
CALCIUM SERPL-MCNC: 8.3 MG/DL — LOW (ref 8.4–10.5)
CHLORIDE SERPL-SCNC: 104 MMOL/L — SIGNIFICANT CHANGE UP (ref 98–107)
CO2 SERPL-SCNC: 23 MMOL/L — SIGNIFICANT CHANGE UP (ref 22–31)
CREAT SERPL-MCNC: 1.24 MG/DL — SIGNIFICANT CHANGE UP (ref 0.5–1.3)
EOSINOPHIL # BLD AUTO: 0.27 K/UL — SIGNIFICANT CHANGE UP (ref 0–0.5)
EOSINOPHIL NFR BLD AUTO: 3.2 % — SIGNIFICANT CHANGE UP (ref 0–6)
GLUCOSE SERPL-MCNC: 99 MG/DL — SIGNIFICANT CHANGE UP (ref 70–99)
HCT VFR BLD CALC: 39.6 % — SIGNIFICANT CHANGE UP (ref 39–50)
HGB BLD-MCNC: 12.9 G/DL — LOW (ref 13–17)
IMM GRANULOCYTES # BLD AUTO: 0.03 # — SIGNIFICANT CHANGE UP
IMM GRANULOCYTES NFR BLD AUTO: 0.4 % — SIGNIFICANT CHANGE UP (ref 0–1.5)
LYMPHOCYTES # BLD AUTO: 1.55 K/UL — SIGNIFICANT CHANGE UP (ref 1–3.3)
LYMPHOCYTES # BLD AUTO: 18.5 % — SIGNIFICANT CHANGE UP (ref 13–44)
MCHC RBC-ENTMCNC: 27.7 PG — SIGNIFICANT CHANGE UP (ref 27–34)
MCHC RBC-ENTMCNC: 32.6 % — SIGNIFICANT CHANGE UP (ref 32–36)
MCV RBC AUTO: 85 FL — SIGNIFICANT CHANGE UP (ref 80–100)
MONOCYTES # BLD AUTO: 0.65 K/UL — SIGNIFICANT CHANGE UP (ref 0–0.9)
MONOCYTES NFR BLD AUTO: 7.7 % — SIGNIFICANT CHANGE UP (ref 2–14)
NEUTROPHILS # BLD AUTO: 5.86 K/UL — SIGNIFICANT CHANGE UP (ref 1.8–7.4)
NEUTROPHILS NFR BLD AUTO: 69.7 % — SIGNIFICANT CHANGE UP (ref 43–77)
NRBC # FLD: 0 — SIGNIFICANT CHANGE UP
PLATELET # BLD AUTO: 224 K/UL — SIGNIFICANT CHANGE UP (ref 150–400)
PMV BLD: 9.1 FL — SIGNIFICANT CHANGE UP (ref 7–13)
POTASSIUM SERPL-MCNC: 3.6 MMOL/L — SIGNIFICANT CHANGE UP (ref 3.5–5.3)
POTASSIUM SERPL-SCNC: 3.6 MMOL/L — SIGNIFICANT CHANGE UP (ref 3.5–5.3)
PROT SERPL-MCNC: 6 G/DL — SIGNIFICANT CHANGE UP (ref 6–8.3)
RBC # BLD: 4.66 M/UL — SIGNIFICANT CHANGE UP (ref 4.2–5.8)
RBC # FLD: 14.2 % — SIGNIFICANT CHANGE UP (ref 10.3–14.5)
SODIUM SERPL-SCNC: 140 MMOL/L — SIGNIFICANT CHANGE UP (ref 135–145)
SPECIMEN SOURCE: SIGNIFICANT CHANGE UP
SPECIMEN SOURCE: SIGNIFICANT CHANGE UP
TROPONIN T, HIGH SENSITIVITY: 7 NG/L — SIGNIFICANT CHANGE UP (ref ?–14)
WBC # BLD: 8.4 K/UL — SIGNIFICANT CHANGE UP (ref 3.8–10.5)
WBC # FLD AUTO: 8.4 K/UL — SIGNIFICANT CHANGE UP (ref 3.8–10.5)

## 2018-09-28 PROCEDURE — 99217: CPT

## 2018-09-28 RX ORDER — FAMOTIDINE 10 MG/ML
1 INJECTION INTRAVENOUS
Qty: 14 | Refills: 0
Start: 2018-09-28 | End: 2018-10-04

## 2018-09-28 RX ORDER — FAMOTIDINE 10 MG/ML
20 INJECTION INTRAVENOUS ONCE
Qty: 0 | Refills: 0 | Status: COMPLETED | OUTPATIENT
Start: 2018-09-28 | End: 2018-09-28

## 2018-09-28 RX ORDER — DIPHENHYDRAMINE HCL 50 MG
25 CAPSULE ORAL ONCE
Qty: 0 | Refills: 0 | Status: COMPLETED | OUTPATIENT
Start: 2018-09-28 | End: 2018-09-28

## 2018-09-28 RX ORDER — KETOROLAC TROMETHAMINE 30 MG/ML
15 SYRINGE (ML) INJECTION ONCE
Qty: 0 | Refills: 0 | Status: DISCONTINUED | OUTPATIENT
Start: 2018-09-28 | End: 2018-09-28

## 2018-09-28 RX ORDER — SODIUM CHLORIDE 9 MG/ML
1000 INJECTION INTRAMUSCULAR; INTRAVENOUS; SUBCUTANEOUS
Qty: 0 | Refills: 0 | Status: DISCONTINUED | OUTPATIENT
Start: 2018-09-28 | End: 2018-10-01

## 2018-09-28 RX ORDER — ONDANSETRON 8 MG/1
1 TABLET, FILM COATED ORAL
Qty: 12 | Refills: 0
Start: 2018-09-28 | End: 2018-10-01

## 2018-09-28 RX ADMIN — SODIUM CHLORIDE 125 MILLILITER(S): 9 INJECTION INTRAMUSCULAR; INTRAVENOUS; SUBCUTANEOUS at 00:56

## 2018-09-28 RX ADMIN — Medication 15 MILLIGRAM(S): at 00:56

## 2018-09-28 RX ADMIN — Medication 30 MILLILITER(S): at 09:48

## 2018-09-28 RX ADMIN — FAMOTIDINE 20 MILLIGRAM(S): 10 INJECTION INTRAVENOUS at 09:47

## 2018-09-28 RX ADMIN — Medication 15 MILLIGRAM(S): at 01:11

## 2018-09-28 RX ADMIN — Medication 25 MILLIGRAM(S): at 03:19

## 2018-09-28 NOTE — DISCUSSION/SUMMARY
[FreeTextEntry1] :  59 yo M, former smoker with PMH of chronic pancreatitis, ETOH abuse, pancreatic pseudocyst h/o exp lap cystgastrostomy 2012, presented to ED on 9/27 with epigastric pain,  a/w nausea and vomiting. hiccups x3 days, ETOH consumption 3 days ago. In ER, EKG non-ischemic, CT A/P neg acute abd pathology, RUQ US shows cholelithiasis without acute cholecystitis, failed PO challenge, patient sent to CDU for IVF, antiemetic, pain control, and observation. Patient dc/e on 9/28. Called patient, patient reports improvement in symptoms still with nausea however able to tolerate PO. Patient has GI appointment number for follow up, has not made appt yet. Patient advised to do so. Patient declining ACU appt be made due to symptoms improved. HOwever has appt number to make one if needed. \par \par PC, PGY2.

## 2018-09-28 NOTE — ED CDU PROVIDER INITIAL DAY NOTE - PROGRESS NOTE DETAILS
Patient sleeping comfortably in bed, NAD, No complaints. Will continue to monitor overnight. MD CHO:  Pt states his abd discomfort and nausea are improved.  Pt in nad, s1s2 rrr no m/r/g, ctabil, abd soft mild epig ttp no r/g.  Will po challenge, reassess.

## 2018-09-28 NOTE — ED CDU PROVIDER DISPOSITION NOTE - NSFOLLOWUPINSTRUCTIONS_ED_ALL_ED_FT
I called results to patient.  As noted for months occasionally short lived left mid back pain without other symptoms.  Comes and goes.  Ct normal.  If not gone soon call for physical therapy.    Hossein Farah M.D.     Follow up with your PMD within 48-72 hours.  Also recommend follow up with your gastroenterologist - if unable to get an appointment please see gastroenterology clinic information below. Rest, increase fluids. Take Zofran 4mg dissolve 1 tab under tongue every 4-6 hours as needed for nausea or vomiting. Take Pepcid 20mg 1 tab 2x/day for 1-2 days as needed for epigastric burning.  Eat bland, small meals as tolerated.  Worsening or continued fever, chills, weakness, nausea, vomiting, abdominal pain return to ER     [facility]  Gastroenterology  [address], [address]  [place] [address]  Phone: [phone]

## 2018-09-28 NOTE — ED CDU PROVIDER DISPOSITION NOTE - NSFOLLOWUPCLINICS_GEN_ALL_ED_FT
[facility]  Gastroenterology  [address] [place] [address] [address]  [place] [address]  Phone: [phone]  Fax:

## 2018-09-28 NOTE — ED CDU PROVIDER INITIAL DAY NOTE - MEDICAL DECISION MAKING DETAILS
58 M, former smoker with PMH of chronic pancreatitis, ETOH abuse, h/o exp lap, presents with epigastric abdominal pain, burning midsternal chest pain a/w nausea and vomiting since yesterday; hiccups x3 days, ETOH consumption 3 days ago. In ER, EKG non-ischemic, CT A/P neg acute abd pathology, RUQ US shows cholelithiasis without acute cholecystitis, failed PO challenge, sent to CDU for IVF, antiemetic, pain control, and observation. 57 yo M, former smoker with PMH of chronic pancreatitis, ETOH abuse, pancreatic pseudocyst h/o exp lap cystgastrostomy 2012, presents with epigastric pain,  a/w nausea and vomiting since yesterday; hiccups x3 days, ETOH consumption 3 days ago. In ER, EKG non-ischemic, CT A/P neg acute abd pathology, RUQ US shows cholelithiasis without acute cholecystitis, failed PO challenge, sent to CDU for IVF, antiemetic, pain control, and observation.

## 2018-09-28 NOTE — ED CDU PROVIDER INITIAL DAY NOTE - ATTENDING CONTRIBUTION TO CARE
CDU MD STRINGER:  I performed a face to face bedside interview with patient regarding history of present illness, review of symptoms and past medical history. I completed an independent physical exam.  I have discussed patient's plan of care with PA.   I agree with note as stated above, having amended the EMR as needed to reflect my findings. I have discussed the assessment and plan of care.  This includes during the time I functioned as the attending physician for this patient.

## 2018-09-28 NOTE — ED CDU PROVIDER INITIAL DAY NOTE - NONTENDER LOCATION
left upper quadrant/umbilical/suprapubic/right upper quadrant/right costovertebral angle/left lower quadrant/right lower quadrant/periumbilical/left costovertebral angle

## 2018-09-28 NOTE — ED CDU PROVIDER DISPOSITION NOTE - CLINICAL COURSE
57 y/o M with h/o etoh abuse and pancreatic pseudocyst presented to ed with abdominal pain, n/v. labs and ct a/p were unremarkable in ed and pt was sent to cdu for symptomatic control of n/v as pt was still not able to tolerate po. This am pt notes improvement of sxs and tolerated po well. Pt sent home with zofran and pepcid and instructions to f/u with his GI MD.

## 2018-09-28 NOTE — ED CDU PROVIDER INITIAL DAY NOTE - OBJECTIVE STATEMENT
58M with PMHx etoh abuse, pancreatitis (?pancreatic cyst) presents with abdominal pain, nausea/vomiting that began on Tuesday. Patient reports that on Monday he had 1/2 a shot of liquor after not drinking alcohol since December last year. Patient states the abdominal pain is sharp and worse on the right upper side but goes across the middle of his abdomen as well. He reports some associated decreased PO intake, chills, and burning "chest pain" associated with hiccups/reflux. Patient denies fever, palpitations, headache, dizziness, leg pain/swelling, blood in vomitus, diarrhea, blood in stool, cough, shortness of breath, numbness/tingling, dysuria, hematuria, difficulty urinating. 57 yo M, former smoker with PMH alcohol abuse, chronic pancreatitis, pancreatic pseudocyst h/o lap cystogastrostomy 2012, presents to ER c/o epigastric pain a/w nausea and vomiting x1 day; hiccups x3 days. Pt states he hasn't been drinking alcohol since last December, and had only one small shot of vodka 3 days ago, and been having intermittent hiccups, currently resolved in ER. Reports having intermittent epigastric burning, aching pain, 8/10, non-radiating, no aggravating factors, relieved with apple juice. States having nausea and vomiting NBNB, unable tolerate oral intake. Pt denies fever, headache, dizziness, sob, back pain, diarrhea, constipation, dysuria, hematuria, melena, leg swelling, recent travel or any other complaints.

## 2018-10-02 LAB
BACTERIA BLD CULT: SIGNIFICANT CHANGE UP
BACTERIA BLD CULT: SIGNIFICANT CHANGE UP

## 2019-11-18 NOTE — ED CDU PROVIDER DISPOSITION NOTE - DISCHARGE REVIEW MATERIAL PRESENTED
patient states that all day has been bleeding rectally now passing clots patient states that she is anemic, patient states not allowed to have Nsaids, ACE, Arbs
.

## 2019-12-01 PROCEDURE — G9005: CPT

## 2019-12-27 ENCOUNTER — INPATIENT (INPATIENT)
Facility: HOSPITAL | Age: 59
LOS: 2 days | Discharge: HOME | End: 2019-12-30
Attending: HOSPITALIST | Admitting: HOSPITALIST
Payer: MEDICAID

## 2019-12-27 VITALS
SYSTOLIC BLOOD PRESSURE: 147 MMHG | HEART RATE: 123 BPM | TEMPERATURE: 99 F | DIASTOLIC BLOOD PRESSURE: 100 MMHG | RESPIRATION RATE: 20 BRPM | OXYGEN SATURATION: 97 %

## 2019-12-27 DIAGNOSIS — K85.20 ALCOHOL INDUCED ACUTE PANCREATITIS WITHOUT NECROSIS OR INFECTION: ICD-10-CM

## 2019-12-27 DIAGNOSIS — Z98.89 OTHER SPECIFIED POSTPROCEDURAL STATES: Chronic | ICD-10-CM

## 2019-12-27 DIAGNOSIS — F10.10 ALCOHOL ABUSE, UNCOMPLICATED: ICD-10-CM

## 2019-12-27 DIAGNOSIS — R10.13 EPIGASTRIC PAIN: ICD-10-CM

## 2019-12-27 LAB
ALBUMIN SERPL ELPH-MCNC: 4.3 G/DL — SIGNIFICANT CHANGE UP (ref 3.3–5)
ALP SERPL-CCNC: 84 U/L — SIGNIFICANT CHANGE UP (ref 40–120)
ALT FLD-CCNC: 90 U/L — HIGH (ref 4–41)
ANION GAP SERPL CALC-SCNC: 21 MMO/L — HIGH (ref 7–14)
APTT BLD: 27.1 SEC — LOW (ref 27.5–36.3)
AST SERPL-CCNC: 116 U/L — HIGH (ref 4–40)
BASE EXCESS BLDV CALC-SCNC: -0.3 MMOL/L — SIGNIFICANT CHANGE UP
BASE EXCESS BLDV CALC-SCNC: -1.2 MMOL/L — SIGNIFICANT CHANGE UP
BASOPHILS # BLD AUTO: 0.05 K/UL — SIGNIFICANT CHANGE UP (ref 0–0.2)
BASOPHILS NFR BLD AUTO: 0.7 % — SIGNIFICANT CHANGE UP (ref 0–2)
BILIRUB SERPL-MCNC: 0.6 MG/DL — SIGNIFICANT CHANGE UP (ref 0.2–1.2)
BLOOD GAS VENOUS - CREATININE: 0.74 MG/DL — SIGNIFICANT CHANGE UP (ref 0.5–1.3)
BLOOD GAS VENOUS - CREATININE: 0.86 MG/DL — SIGNIFICANT CHANGE UP (ref 0.5–1.3)
BLOOD GAS VENOUS - FIO2: 21 — SIGNIFICANT CHANGE UP
BLOOD GAS VENOUS - FIO2: 21 — SIGNIFICANT CHANGE UP
BUN SERPL-MCNC: 9 MG/DL — SIGNIFICANT CHANGE UP (ref 7–23)
CALCIUM SERPL-MCNC: 9.2 MG/DL — SIGNIFICANT CHANGE UP (ref 8.4–10.5)
CHLORIDE BLDV-SCNC: 102 MMOL/L — SIGNIFICANT CHANGE UP (ref 96–108)
CHLORIDE BLDV-SCNC: 108 MMOL/L — SIGNIFICANT CHANGE UP (ref 96–108)
CHLORIDE SERPL-SCNC: 98 MMOL/L — SIGNIFICANT CHANGE UP (ref 98–107)
CO2 SERPL-SCNC: 20 MMOL/L — LOW (ref 22–31)
CREAT SERPL-MCNC: 0.83 MG/DL — SIGNIFICANT CHANGE UP (ref 0.5–1.3)
EOSINOPHIL # BLD AUTO: 0.09 K/UL — SIGNIFICANT CHANGE UP (ref 0–0.5)
EOSINOPHIL NFR BLD AUTO: 1.3 % — SIGNIFICANT CHANGE UP (ref 0–6)
GAS PNL BLDV: 139 MMOL/L — SIGNIFICANT CHANGE UP (ref 136–146)
GAS PNL BLDV: 141 MMOL/L — SIGNIFICANT CHANGE UP (ref 136–146)
GLUCOSE BLDV-MCNC: 169 MG/DL — HIGH (ref 70–99)
GLUCOSE BLDV-MCNC: 263 MG/DL — HIGH (ref 70–99)
GLUCOSE SERPL-MCNC: 247 MG/DL — HIGH (ref 70–99)
HCO3 BLDV-SCNC: 23 MMOL/L — SIGNIFICANT CHANGE UP (ref 20–27)
HCO3 BLDV-SCNC: 23 MMOL/L — SIGNIFICANT CHANGE UP (ref 20–27)
HCT VFR BLD CALC: 47.6 % — SIGNIFICANT CHANGE UP (ref 39–50)
HCT VFR BLDV CALC: 48.8 % — SIGNIFICANT CHANGE UP (ref 39–51)
HCT VFR BLDV CALC: 49 % — SIGNIFICANT CHANGE UP (ref 39–51)
HGB BLD-MCNC: 15.9 G/DL — SIGNIFICANT CHANGE UP (ref 13–17)
HGB BLDV-MCNC: 15.9 G/DL — SIGNIFICANT CHANGE UP (ref 13–17)
HGB BLDV-MCNC: 16 G/DL — SIGNIFICANT CHANGE UP (ref 13–17)
IMM GRANULOCYTES NFR BLD AUTO: 0.6 % — SIGNIFICANT CHANGE UP (ref 0–1.5)
INR BLD: 1.02 — SIGNIFICANT CHANGE UP (ref 0.88–1.17)
LACTATE BLDV-MCNC: 4.1 MMOL/L — CRITICAL HIGH (ref 0.5–2)
LACTATE BLDV-MCNC: 6.6 MMOL/L — CRITICAL HIGH (ref 0.5–2)
LIDOCAIN IGE QN: 34 U/L — SIGNIFICANT CHANGE UP (ref 7–60)
LYMPHOCYTES # BLD AUTO: 1.43 K/UL — SIGNIFICANT CHANGE UP (ref 1–3.3)
LYMPHOCYTES # BLD AUTO: 20.1 % — SIGNIFICANT CHANGE UP (ref 13–44)
MCHC RBC-ENTMCNC: 30 PG — SIGNIFICANT CHANGE UP (ref 27–34)
MCHC RBC-ENTMCNC: 33.4 % — SIGNIFICANT CHANGE UP (ref 32–36)
MCV RBC AUTO: 89.8 FL — SIGNIFICANT CHANGE UP (ref 80–100)
MONOCYTES # BLD AUTO: 0.53 K/UL — SIGNIFICANT CHANGE UP (ref 0–0.9)
MONOCYTES NFR BLD AUTO: 7.5 % — SIGNIFICANT CHANGE UP (ref 2–14)
NEUTROPHILS # BLD AUTO: 4.97 K/UL — SIGNIFICANT CHANGE UP (ref 1.8–7.4)
NEUTROPHILS NFR BLD AUTO: 69.8 % — SIGNIFICANT CHANGE UP (ref 43–77)
NRBC # FLD: 0 K/UL — SIGNIFICANT CHANGE UP (ref 0–0)
PCO2 BLDV: 40 MMHG — LOW (ref 41–51)
PCO2 BLDV: 46 MMHG — SIGNIFICANT CHANGE UP (ref 41–51)
PH BLDV: 7.35 PH — SIGNIFICANT CHANGE UP (ref 7.32–7.43)
PH BLDV: 7.38 PH — SIGNIFICANT CHANGE UP (ref 7.32–7.43)
PLATELET # BLD AUTO: 232 K/UL — SIGNIFICANT CHANGE UP (ref 150–400)
PMV BLD: 9.1 FL — SIGNIFICANT CHANGE UP (ref 7–13)
PO2 BLDV: 44 MMHG — HIGH (ref 35–40)
PO2 BLDV: 61 MMHG — HIGH (ref 35–40)
POTASSIUM BLDV-SCNC: 3.5 MMOL/L — SIGNIFICANT CHANGE UP (ref 3.4–4.5)
POTASSIUM BLDV-SCNC: 3.6 MMOL/L — SIGNIFICANT CHANGE UP (ref 3.4–4.5)
POTASSIUM SERPL-MCNC: 3.8 MMOL/L — SIGNIFICANT CHANGE UP (ref 3.5–5.3)
POTASSIUM SERPL-SCNC: 3.8 MMOL/L — SIGNIFICANT CHANGE UP (ref 3.5–5.3)
PROT SERPL-MCNC: 7.2 G/DL — SIGNIFICANT CHANGE UP (ref 6–8.3)
PROTHROM AB SERPL-ACNC: 11.3 SEC — SIGNIFICANT CHANGE UP (ref 9.8–13.1)
RBC # BLD: 5.3 M/UL — SIGNIFICANT CHANGE UP (ref 4.2–5.8)
RBC # FLD: 13.4 % — SIGNIFICANT CHANGE UP (ref 10.3–14.5)
SAO2 % BLDV: 73.9 % — SIGNIFICANT CHANGE UP (ref 60–85)
SAO2 % BLDV: 90 % — HIGH (ref 60–85)
SODIUM SERPL-SCNC: 139 MMOL/L — SIGNIFICANT CHANGE UP (ref 135–145)
TROPONIN T, HIGH SENSITIVITY: 7 NG/L — SIGNIFICANT CHANGE UP (ref ?–14)
TROPONIN T, HIGH SENSITIVITY: 9 NG/L — SIGNIFICANT CHANGE UP (ref ?–14)
WBC # BLD: 7.11 K/UL — SIGNIFICANT CHANGE UP (ref 3.8–10.5)
WBC # FLD AUTO: 7.11 K/UL — SIGNIFICANT CHANGE UP (ref 3.8–10.5)

## 2019-12-27 PROCEDURE — 71046 X-RAY EXAM CHEST 2 VIEWS: CPT | Mod: 26

## 2019-12-27 PROCEDURE — 74177 CT ABD & PELVIS W/CONTRAST: CPT | Mod: 26

## 2019-12-27 PROCEDURE — 99223 1ST HOSP IP/OBS HIGH 75: CPT

## 2019-12-27 RX ORDER — SODIUM CHLORIDE 9 MG/ML
1000 INJECTION INTRAMUSCULAR; INTRAVENOUS; SUBCUTANEOUS ONCE
Refills: 0 | Status: COMPLETED | OUTPATIENT
Start: 2019-12-27 | End: 2019-12-27

## 2019-12-27 RX ORDER — SODIUM CHLORIDE 9 MG/ML
1000 INJECTION INTRAMUSCULAR; INTRAVENOUS; SUBCUTANEOUS
Refills: 0 | Status: DISCONTINUED | OUTPATIENT
Start: 2019-12-27 | End: 2019-12-30

## 2019-12-27 RX ORDER — KETOROLAC TROMETHAMINE 30 MG/ML
15 SYRINGE (ML) INJECTION ONCE
Refills: 0 | Status: DISCONTINUED | OUTPATIENT
Start: 2019-12-27 | End: 2019-12-27

## 2019-12-27 RX ORDER — METOCLOPRAMIDE HCL 10 MG
10 TABLET ORAL ONCE
Refills: 0 | Status: COMPLETED | OUTPATIENT
Start: 2019-12-27 | End: 2019-12-27

## 2019-12-27 RX ORDER — METOCLOPRAMIDE HCL 10 MG
10 TABLET ORAL EVERY 8 HOURS
Refills: 0 | Status: DISCONTINUED | OUTPATIENT
Start: 2019-12-27 | End: 2019-12-30

## 2019-12-27 RX ORDER — FAMOTIDINE 10 MG/ML
20 INJECTION INTRAVENOUS ONCE
Refills: 0 | Status: COMPLETED | OUTPATIENT
Start: 2019-12-27 | End: 2019-12-27

## 2019-12-27 RX ORDER — ONDANSETRON 8 MG/1
4 TABLET, FILM COATED ORAL ONCE
Refills: 0 | Status: COMPLETED | OUTPATIENT
Start: 2019-12-27 | End: 2019-12-27

## 2019-12-27 RX ADMIN — SODIUM CHLORIDE 1000 MILLILITER(S): 9 INJECTION INTRAMUSCULAR; INTRAVENOUS; SUBCUTANEOUS at 11:59

## 2019-12-27 RX ADMIN — SODIUM CHLORIDE 1000 MILLILITER(S): 9 INJECTION INTRAMUSCULAR; INTRAVENOUS; SUBCUTANEOUS at 10:36

## 2019-12-27 RX ADMIN — SODIUM CHLORIDE 1000 MILLILITER(S): 9 INJECTION INTRAMUSCULAR; INTRAVENOUS; SUBCUTANEOUS at 11:13

## 2019-12-27 RX ADMIN — ONDANSETRON 4 MILLIGRAM(S): 8 TABLET, FILM COATED ORAL at 14:00

## 2019-12-27 RX ADMIN — ONDANSETRON 4 MILLIGRAM(S): 8 TABLET, FILM COATED ORAL at 10:26

## 2019-12-27 RX ADMIN — Medication 15 MILLIGRAM(S): at 21:47

## 2019-12-27 RX ADMIN — SODIUM CHLORIDE 1000 MILLILITER(S): 9 INJECTION INTRAMUSCULAR; INTRAVENOUS; SUBCUTANEOUS at 14:01

## 2019-12-27 RX ADMIN — SODIUM CHLORIDE 150 MILLILITER(S): 9 INJECTION INTRAMUSCULAR; INTRAVENOUS; SUBCUTANEOUS at 21:47

## 2019-12-27 RX ADMIN — SODIUM CHLORIDE 1000 MILLILITER(S): 9 INJECTION INTRAMUSCULAR; INTRAVENOUS; SUBCUTANEOUS at 11:44

## 2019-12-27 RX ADMIN — Medication 10 MILLIGRAM(S): at 18:59

## 2019-12-27 RX ADMIN — FAMOTIDINE 20 MILLIGRAM(S): 10 INJECTION INTRAVENOUS at 10:26

## 2019-12-27 NOTE — H&P ADULT - NSHPLABSRESULTS_GEN_ALL_CORE
12-27    139  |  98  |  9   ----------------------------<  247<H>  3.8   |  20<L>  |  0.83    Ca    9.2      27 Dec 2019 10:16    TPro  7.2  /  Alb  4.3  /  TBili  0.6  /  DBili  x   /  AST  116<H>  /  ALT  90<H>  /  AlkPhos  84  12-27                        15.9   7.11  )-----------( 232      ( 27 Dec 2019 10:16 )             47.6     LIVER FUNCTIONS - ( 27 Dec 2019 10:16 )  Alb: 4.3 g/dL / Pro: 7.2 g/dL / ALK PHOS: 84 u/L / ALT: 90 u/L / AST: 116 u/L / GGT: x           PT/INR - ( 27 Dec 2019 10:16 )   PT: 11.3 SEC;   INR: 1.02       PTT - ( 27 Dec 2019 10:16 )  PTT:27.1 SEC

## 2019-12-27 NOTE — H&P ADULT - NSHPREVIEWOFSYSTEMS_GEN_ALL_CORE
Constitutional Symptoms: No weakness, fevers, chills, weight loss  Eyes: No visual changes, headache, eye pain, double vision  Ears, Nose, Mouth, Throat: No runny nose, sinus pain, ear pain, tinnitus, sore throat, dysphagia, odynophagia  Cardiovascular: No chest pain, palpitations, edema  Respiratory: No cough, wheezing, hemoptysis, shortness of breath  Gastrointestinal: +abdominal pain, N/V, no dysphagia, anorexia, diarrhea/constipation, hematemesis, BRBPR, melena  Genitourinary: No dysuria, frequency, hematuria  Musculoskeletal: No joint pain, joint swelling, decreased ROM  Skin: No pruritus, rashes, lesions, wounds  Neurologic:  No seizures, headache, paraesthesia, numbness, limb weakness    Positives and pertinent negatives noted and all other systems negative.

## 2019-12-27 NOTE — ED ADULT NURSE NOTE - OBJECTIVE STATEMENT
Pt received in exam room 26. alert and oriented x3, ambulatory. pt. w/ a hx of alcohol abuse, states he has not had a drink since 2018 but drank for [place] this year, last shot of vodka at 6am this morning, c/o abd pain, vomiting and chest pain. PMHx PE, pancreatitis. Pt received in exam room 26. alert and oriented x3, ambulatory. pt has hx of alcohol abuse, states he has not had a drink since 2018 but drank for [place] this year because he heard a new baby was being born into his family, last shot of vodka at 6am this morning, c/o abd pain, nausea, vomiting and chest pain. PMHx PE, pancreatitis. Denies fevers, chills, dizziness, sob, headache. labs sent. 20G IV placed to right AC. VS stable. In nad at this time. Comfort measures provided. will monitor.

## 2019-12-27 NOTE — ED PROVIDER NOTE - PHYSICAL EXAMINATION
VITAL SIGNS: I have reviewed nursing notes and confirm.  CONSTITUTIONAL: non-toxic, uncomfortable appearing, actively vomiting  SKIN: no rash, no petechiae.  EYES: pink conjunctiva, anicteric  ENT: tongue and uvular midline, no exudates, moist mucous membranes  NECK: Supple; no meningismus, no JVD, no crepitus  CARD: RRR, no murmurs, no chest wall tenderness  RESP: CTAB, no respiratory distress  ABD: Soft, non-tender, non-distended, no peritoneal signs, no CVA tenderness  EXT: No edema. No calf tenderness  NEURO: Alert, oriented. Neuro exam nonfocal.  PSYCH: Cooperative, appropriate.

## 2019-12-27 NOTE — ED PROVIDER NOTE - CLINICAL SUMMARY MEDICAL DECISION MAKING FREE TEXT BOX
Joce-PGY1: 59 year old male with PMH EtOH abuse, pulmonary embolism in 2010 previously on AC for 1 year after PE, pancreatitis presents with nausea, vomiting, and chest "burning" since this morning. Unclear etiology, concern for pancreatitis as patient had recent binge drinking episode with history of pancreatitis in the past.  Possible gastritis/PUD.  Doubt ACS.  Abdominal exam benign, nontender, nondistended.  Plan includes labs, fluids, EKG, CXR, symptomatic treatment.  If no improvement or worsening symptoms, will consider further imaging. Joce-PGY1: 59 year old male with PMH EtOH abuse, pulmonary embolism in 2010 previously on AC for 1 year after PE, pancreatitis presents with nausea, vomiting, and chest "burning" since this morning. Unclear etiology, concern for pancreatitis as patient had recent binge drinking episode with history of pancreatitis in the past.  Possible gastritis/PUD.  Doubt ACS.  Abdominal exam benign, nontender, nondistended.  Plan includes labs, fluids, EKG, CXR, symptomatic treatment.  If no improvement or worsening symptoms, will consider further imaging.    sharon:  pt here with nausea/ vomiting abd opain after alcohol indulgence over the holiday. hx pancreatitis.  here abd with some mild diffuse and epigastric tenderness.  also tachycardic.  will treat, hydration, pain meds, and re-eval for further activity if continues to be uncomfortable

## 2019-12-27 NOTE — H&P ADULT - NSICDXPASTMEDICALHX_GEN_ALL_CORE_FT
PAST MEDICAL HISTORY:  Abuse, Drug or Alcohol     Pancreatitis     Pseudocyst, pancreas     Pulmonary embolism     Splenic vein thrombosis

## 2019-12-27 NOTE — ED ADULT NURSE NOTE - CHIEF COMPLAINT QUOTE
pt. w/ a hx of alcohol abuse, states he has not had a drink since 2018 but drank for [place] this year, last shot of vodka at 6am this morning, c/o abd pain, vomiting and chest pain. PMHx PE, pancreatitis.

## 2019-12-27 NOTE — ED PROVIDER NOTE - ATTENDING CONTRIBUTION TO CARE
I performed a history and physical exam of the patient and discussed their management with the resident and /or advanced care provider. I reviewed the resident and /or ACP's note and agree with the documented findings and plan of care. My medical decison making and observations are found above.    sharon:  pt here with nausea/ vomiting abd opain after alcohol indulgence over the holiday. hx pancreatitis.  here abd with some mild diffuse and epigastric tenderness.  also tachycardic.  will treat, hydration, pain meds, and re-eval for further activity if continues to be uncomfortable

## 2019-12-27 NOTE — H&P ADULT - HISTORY OF PRESENT ILLNESS
Patient is a 58 y/o M PMH ETOH comb by chronic pancreatitis, PE 2/16, splenic vein thrombosis s/p coumadin p/w nausea, nonbloody vomiting, epigastric burning w/ radiation to throat since this AM. Patient reports previously sober X1 year, started drinking beer and vodka daily 3 days ago. Had guests over for the holidays, alcohol was available and he started drinking. Reports 2-3 beers and 1-2 shots of vodka daily. Last PO intake approximately 1800 yesterday evening. Attempted to eat jello and drink in the ED, triggered moderate abd pain and nausea.

## 2019-12-27 NOTE — H&P ADULT - PROBLEM SELECTOR PLAN 1
may be 2/2 chronic pancreatitis, exacerbated by acute ETOH use, vs gastritis, will keep NPO, restart IVF, symptom control

## 2019-12-27 NOTE — H&P ADULT - NSHPPHYSICALEXAM_GEN_ALL_CORE
T(C): 37.2 (12-27-19 @ 20:01), Max: 37.6 (12-27-19 @ 14:13)  HR: 98 (12-27-19 @ 20:01) (98 - 123)  BP: 130/78 (12-27-19 @ 20:01) (130/78 - 147/100)  RR: 17 (12-27-19 @ 20:01) (17 - 20)  SpO2: 100% (12-27-19 @ 20:01) (97% - 100%)    Constitutional: distress 2/2 pain, well-developed, well-nourished  Ears, Nose, Mouth, and Throat: normal external ears and nose, normal hearing, moist oral mucosa  Eyes: normal conjunctiva, EOMI, PERRL  Neck: supple, no JVD  Respiratory: Clear to auscultation bilaterally. No wheezes, rales or rhonchi. Normal respiratory effort  Cardiovascular: RRR, no M/R/G, no edema, 2+ Peripheral Pulses  Gastrointestinal: soft, nontender, nondistended, +BS, no hernia  Skin: warm, dry, no rash  Neurologic: sensation grossly intact, CN grossly intact, non-focal exam  Musculoskeletal: no clubbing, no cyanosis, no joint swelling  Psychiatric: AOX3, appropriate mood, affect

## 2019-12-27 NOTE — H&P ADULT - PROBLEM SELECTOR PLAN 3
1.  Name of PCP: no PCP  2.  PCP Contacted on Admission: [ ] Y    [X] N    3.  PCP contacted at Discharge: [ ] Y    [ ] N    [ ] N/A  4.  Post-Discharge Appointment Date and Location:  5.  Summary of Handoff given to PCP:

## 2019-12-27 NOTE — H&P ADULT - ASSESSMENT
Patient is a 58 y/o M PMH ETOH comb by chronic pancreatitis, PE 2/16, splenic vein thrombosis s/p coumadin p/w nausea, nonbloody vomiting, epigastric burning w/ radiation to throat since this AM

## 2019-12-27 NOTE — ED ADULT TRIAGE NOTE - CHIEF COMPLAINT QUOTE
pt. w/ a hx of alcohol abuse, states he has not had a drink since 2018 but drank for [place] this year, last shot of vodka at 6am this morning, c/o abd pain, vomiting and chest pain. PMHx PE, pancreatitis.

## 2019-12-27 NOTE — H&P ADULT - PROBLEM SELECTOR PLAN 2
low risk CIWA, did not require any PRNs at this time, if does not require any PRNs by luisa, can likely discontinue

## 2019-12-27 NOTE — ED PROVIDER NOTE - OBJECTIVE STATEMENT
59 year old male with PMH EtOH abuse, pulmonary embolism in 2010 previously on AC for 1 year after PE, pancreatitis presents with nausea, vomiting, and chest "burning" since this morning. Pt states he was previously sober for 1 year, but started drinking 3 days ago with vodka daily.  Pt reports nausea, vomiting multiple episodes of nonbloody nonbilious emesis per hour, chest "burning" radiating to his throat, and epigastric "cramping."  Denies any abdominal pain or radiation to the back, bloody stools, diarrhea, black tarry stools, fever, hematemesis, shortness of breath, diaphoresis, or rash.  Last PO intake approximately 1800 yesterday evening. Pt states his symptoms are not similar to past episodes of pancreatitis in the past. Denies any recent injury or trauma. Denies any additional complaints.

## 2019-12-28 ENCOUNTER — TRANSCRIPTION ENCOUNTER (OUTPATIENT)
Age: 59
End: 2019-12-28

## 2019-12-28 DIAGNOSIS — Z02.9 ENCOUNTER FOR ADMINISTRATIVE EXAMINATIONS, UNSPECIFIED: ICD-10-CM

## 2019-12-28 PROCEDURE — 99233 SBSQ HOSP IP/OBS HIGH 50: CPT

## 2019-12-28 RX ORDER — ACETAMINOPHEN 500 MG
2 TABLET ORAL
Qty: 0 | Refills: 0 | DISCHARGE

## 2019-12-28 RX ORDER — INFLUENZA VIRUS VACCINE 15; 15; 15; 15 UG/.5ML; UG/.5ML; UG/.5ML; UG/.5ML
0.5 SUSPENSION INTRAMUSCULAR ONCE
Refills: 0 | Status: DISCONTINUED | OUTPATIENT
Start: 2019-12-28 | End: 2019-12-30

## 2019-12-28 RX ADMIN — SODIUM CHLORIDE 150 MILLILITER(S): 9 INJECTION INTRAMUSCULAR; INTRAVENOUS; SUBCUTANEOUS at 06:15

## 2019-12-28 RX ADMIN — SODIUM CHLORIDE 150 MILLILITER(S): 9 INJECTION INTRAMUSCULAR; INTRAVENOUS; SUBCUTANEOUS at 12:01

## 2019-12-28 NOTE — PROGRESS NOTE ADULT - PROBLEM SELECTOR PLAN 1
may be 2/2 chronic pancreatitis, exacerbated by acute ETOH use, vs gastritis, will  IVF, symptom control   tolerating clear liquid diet, will advance as tolerated

## 2019-12-28 NOTE — DISCHARGE NOTE PROVIDER - PROVIDER TOKENS
FREE:[LAST:[PCP],PHONE:[(   )    -],FAX:[(   )    -]] PROVIDER:[TOKEN:[[id number]]],FREE:[LAST:[PCP],PHONE:[(   )    -],FAX:[(   )    -]]

## 2019-12-28 NOTE — DISCHARGE NOTE PROVIDER - CARE PROVIDER_API CALL
PCP,   Phone: (   )    -  Fax: (   )    -  Follow Up Time: Fauzia Kim)  EndocrinologyMetabDiabetes; Internal Medicine  [address] [place] [address]  Phone: [phone]  Fax: [phone]  Follow Up Time:     PCP,   Phone: (   )    -  Fax: (   )    -  Follow Up Time:

## 2019-12-28 NOTE — DISCHARGE NOTE PROVIDER - NSDCCPCAREPLAN_GEN_ALL_CORE_FT
PRINCIPAL DISCHARGE DIAGNOSIS  Diagnosis: Alcohol-induced acute pancreatitis without infection or necrosis  Assessment and Plan of Treatment:       SECONDARY DISCHARGE DIAGNOSES  Diagnosis: Alcohol abuse  Assessment and Plan of Treatment: Alcohol abuse PRINCIPAL DISCHARGE DIAGNOSIS  Diagnosis: Alcohol-induced acute pancreatitis without infection or necrosis  Assessment and Plan of Treatment: CT showed:  chronic pancreatitis, as noted on prior studies, with increased calcifications in the pancreatic head. A new 0.8 cm cystic structure is seen in the pancreatic head, which may represent a small pseudocyst. Marked hepatic steatosis.  *****Abstain from alcohol, avoid fatty food****      SECONDARY DISCHARGE DIAGNOSES  Diagnosis: Alcohol abuse  Assessment and Plan of Treatment: *****Abstain from alcohol. Take daily multivitamin Thiamine and folic acid PRINCIPAL DISCHARGE DIAGNOSIS  Diagnosis: Alcohol-induced acute pancreatitis without infection or necrosis  Assessment and Plan of Treatment: CT showed:  chronic pancreatitis, as noted on prior studies, with increased calcifications in the pancreatic head. A new 0.8 cm cystic structure is seen in the pancreatic head, which may represent a small pseudocyst. Marked hepatic steatosis.  *****Abstain from alcohol, avoid fatty food****      SECONDARY DISCHARGE DIAGNOSES  Diagnosis: Pseudocyst, pancreas  Assessment and Plan of Treatment: on CT scan you were found to have a pseudocyst follow up with your [facility] doctor in 1 week call for appointment    Diagnosis: Diabetes mellitus  Assessment and Plan of Treatment: follow up with dr jones in 1 week call for appointment   continue taking your metformin as perscribed and monitor your diet maintain a low carbohydrate diet    Diagnosis: Alcohol abuse  Assessment and Plan of Treatment: *****Abstain from alcohol. Take daily multivitamin Thiamine and folic acid

## 2019-12-28 NOTE — PROGRESS NOTE ADULT - SUBJECTIVE AND OBJECTIVE BOX
Prior auth paperwork printed and completed and faxed to [phone] with confirmation of receipt. Patient is a 59y old  Male who presents with a chief complaint of abd pain (28 Dec 2019 12:36)      SUBJECTIVE / OVERNIGHT EVENTS: patient seen and examined by bedside at 12:20 Pm,  pt feeling better now , abd pain, nausea and vomiting resolved, pt denies headache, dizziness, SOB, CP, Palpitations  CIWA score 0  MEDICATIONS  (STANDING):  sodium chloride 0.9%. 1000 milliLiter(s) (150 mL/Hr) IV Continuous <Continuous>    MEDICATIONS  (PRN):  LORazepam   Injectable 2 milliGRAM(s) IV Push every 1 hour PRN Symptom-triggered: each CIWA -Ar score 8 or GREATER  metoclopramide Injectable 10 milliGRAM(s) IV Push every 8 hours PRN nausea/vomiting      Vital Signs Last 24 Hrs  T(C): 36.7 (28 Dec 2019 12:38), Max: 37.4 (27 Dec 2019 17:34)  T(F): 98 (28 Dec 2019 12:38), Max: 99.3 (27 Dec 2019 17:34)  HR: 91 (28 Dec 2019 12:38) (87 - 101)  BP: 143/87 (28 Dec 2019 12:38) (130/78 - 143/87)  BP(mean): --  RR: 16 (28 Dec 2019 12:38) (14 - 18)  SpO2: 97% (28 Dec 2019 12:38) (97% - 100%)  CAPILLARY BLOOD GLUCOSE        I&O's Summary      PHYSICAL EXAM:  GENERAL: NAD, well-developed  HEAD:  Atraumatic, Normocephalic  EYES: EOMI, PERRLA, conjunctiva and sclera clear  NECK: Supple,   CHEST/LUNG: Clear to auscultation bilaterally; No wheeze  HEART: Regular rate and rhythm;   ABDOMEN: Soft, Nontender, Nondistended; Bowel sounds present  EXTREMITIES:  2+ Peripheral Pulses, No clubbing, cyanosis, or edema  PSYCH: AAOx3  NEUROLOGY: non-focal, no tremors   SKIN: No rashes or lesions    LABS:                        15.9   7.11  )-----------( 232      ( 27 Dec 2019 10:16 )             47.6     12-27    139  |  98  |  9   ----------------------------<  247<H>  3.8   |  20<L>  |  0.83    Ca    9.2      27 Dec 2019 10:16    TPro  7.2  /  Alb  4.3  /  TBili  0.6  /  DBili  x   /  AST  116<H>  /  ALT  90<H>  /  AlkPhos  84  12-27    PT/INR - ( 27 Dec 2019 10:16 )   PT: 11.3 SEC;   INR: 1.02          PTT - ( 27 Dec 2019 10:16 )  PTT:27.1 SEC          RADIOLOGY & ADDITIONAL TESTS:  < from: CT Abdomen and Pelvis w/ IV Cont (12.27.19 @ 14:52) >  LOWER CHEST: Within normal limits.    LIVER: Diffuse severe steatosis. Stable hyperenhancing subcentimeter foci at the hepatic dome, which may be related to portal-venous shunt.  Hyperenhancing focus in segment 6 may represent a flash filling hemangioma. Mild heterogeneous attenuation of the liver likely represents transient perfusion differences.  BILE DUCTS: Normal caliber.  GALLBLADDER: Within normal limits.  SPLEEN: Within normal limits.  PANCREAS: Pancreatic calcifications consistent with chronic pancreatitis, with increased calcifications in the pancreatic head. A new 0.8 cm cystic structure is seen in the pancreatic head (602:43). Parenchymal defect at the junction of the pancreatic neck, unchanged.  ADRENALS: Within normal limits.  KIDNEYS/URETERS: Within normal limits.    BLADDER: Within normal limits.  REPRODUCTIVE ORGANS: Prostate gland is within normal limits.    BOWEL: Surgical sutures along the gastric antrum and pylorus. No bowel obstruction. Appendix is normal.  Colonic diverticulosis.  PERITONEUM: No ascites.  VESSELS: Mild atherosclerotic calcification.  RETROPERITONEUM/LYMPH NODES: No lymphadenopathy.    ABDOMINAL WALL: Small right fat-containing inguinal hernia.  BONES: Degenerative changes.  Sclerotic foci in the iliac bones bilaterally, unchanged.    IMPRESSION:     Findings consistent with chronic pancreatitis, as noted on prior studies, with increased calcifications in the pancreatic head. A new 0.8 cm cystic structure is seen in the pancreatic head, which may represent a small pseudocyst.    Marked hepatic steatosis.            < end of copied text >    Imaging Personally Reviewed:    Consultant(s) Notes Reviewed:      Care Discussed with Consultants/Other Providers:

## 2019-12-28 NOTE — DISCHARGE NOTE PROVIDER - HOSPITAL COURSE
Epigastric pain.      -may be 2/2 chronic pancreatitis, exacerbated by acute ETOH use, vs gastritis    -will keep NPO, restart IVF, symptom control    - advance diet as tolerated        Alcohol abuse.      -low risk CIWA    -did not require any PRNs upon arrival    CIWA scores 0 60 y/o M PMH ETOH comb by chronic pancreatitis, PE 2/16, splenic vein thrombosis s/p coumadin p/w nausea, nonbloody vomiting, epigastric burning w/ radiation to throat . CT showed:  chronic pancreatitis, as noted on prior studies, with increased calcifications in the pancreatic head. A new 0.8 cm cystic structure is seen in the pancreatic head, which may represent a small pseudocyst. Marked hepatic steatosis. Treated w/ IVF, Symptoms triggered CIWA. Pt was able tp tolerated regular diet and is optimized for discharge w/ outpt follow up. Counseled importance of Alcohol cessation 58 y/o M PMH ETOH complicated by chronic pancreatitis, PE 2/16, splenic vein thrombosis s/p coumadin p/w nausea, nonbloody vomiting, epigastric burning w/ radiation to throat . CT showed:  chronic pancreatitis, as noted on prior studies, with increased calcifications in the pancreatic head. A new 0.8 cm cystic structure is seen in the pancreatic head, which may represent a small pseudocyst. Marked hepatic steatosis. Treated w/ IVF, Symptoms triggered CIWA.  New onset type 2 diabetes mellitus. Plan: pt with Hb A1c of 11.1, no Hx of DM started on basal and bolus insulin while in the hospital.  for discharge changed to metformin 1000mg BID.      Pt was able to tolerate carbohydrate diet and is optimized for discharge w/ outpatient  follow up. Counseled importance of Alcohol cessation

## 2019-12-28 NOTE — DISCHARGE NOTE PROVIDER - NSDCMRMEDTOKEN_GEN_ALL_CORE_FT
Tylenol 500 mg oral tablet: 2 tab(s) orally every 6 hours, As Needed folic acid 1 mg oral tablet: 1 tab(s) orally once a day  metFORMIN 500 mg oral tablet: 2 tab(s) orally 2 times a day   Multiple Vitamins oral tablet: 1 tab(s) orally once a day  thiamine 100 mg oral tablet: 1 tab(s) orally once a day folic acid 1 mg oral tablet: 1 tab(s) orally once a day  freestyle precision bruce glucometer: check blood sugar as directed by your doctor   freestyle precision neoglucometer test strips : check blood sugar as directed by your doctor   lancets : check blood sugar as prescribed by your doctor   metFORMIN 500 mg oral tablet: 2 tab(s) orally 2 times a day   Multiple Vitamins oral tablet: 1 tab(s) orally once a day  thiamine 100 mg oral tablet: 1 tab(s) orally once a day folic acid 1 mg oral tablet: 1 tab(s) orally once a day  freestyle precision bruce glucometer: 1 unit(s) injectable 3 times a day   freestyle precision neoglucometer test strips : 1 unit(s) injectable 3 times a day   lancets : 1 unit(s) injectable 3 times a day   metFORMIN 500 mg oral tablet: 2 tab(s) orally 2 times a day   Multiple Vitamins oral tablet: 1 tab(s) orally once a day  thiamine 100 mg oral tablet: 1 tab(s) orally once a day

## 2019-12-29 DIAGNOSIS — E11.9 TYPE 2 DIABETES MELLITUS WITHOUT COMPLICATIONS: ICD-10-CM

## 2019-12-29 DIAGNOSIS — K85.20 ALCOHOL INDUCED ACUTE PANCREATITIS WITHOUT NECROSIS OR INFECTION: ICD-10-CM

## 2019-12-29 LAB
ALBUMIN SERPL ELPH-MCNC: 3.5 G/DL — SIGNIFICANT CHANGE UP (ref 3.3–5)
ALP SERPL-CCNC: 69 U/L — SIGNIFICANT CHANGE UP (ref 40–120)
ALT FLD-CCNC: 63 U/L — HIGH (ref 4–41)
ANION GAP SERPL CALC-SCNC: 14 MMO/L — SIGNIFICANT CHANGE UP (ref 7–14)
AST SERPL-CCNC: 65 U/L — HIGH (ref 4–40)
BILIRUB DIRECT SERPL-MCNC: 0.3 MG/DL — HIGH (ref 0.1–0.2)
BILIRUB SERPL-MCNC: 1.5 MG/DL — HIGH (ref 0.2–1.2)
BUN SERPL-MCNC: 5 MG/DL — LOW (ref 7–23)
CALCIUM SERPL-MCNC: 8.6 MG/DL — SIGNIFICANT CHANGE UP (ref 8.4–10.5)
CHLORIDE SERPL-SCNC: 100 MMOL/L — SIGNIFICANT CHANGE UP (ref 98–107)
CO2 SERPL-SCNC: 23 MMOL/L — SIGNIFICANT CHANGE UP (ref 22–31)
CREAT SERPL-MCNC: 0.83 MG/DL — SIGNIFICANT CHANGE UP (ref 0.5–1.3)
GLUCOSE SERPL-MCNC: 204 MG/DL — HIGH (ref 70–99)
HBA1C BLD-MCNC: 11.1 % — HIGH (ref 4–5.6)
HCT VFR BLD CALC: 45.4 % — SIGNIFICANT CHANGE UP (ref 39–50)
HGB BLD-MCNC: 15 G/DL — SIGNIFICANT CHANGE UP (ref 13–17)
LACTATE SERPL-SCNC: 1.9 MMOL/L — SIGNIFICANT CHANGE UP (ref 0.5–2)
MAGNESIUM SERPL-MCNC: 1.8 MG/DL — SIGNIFICANT CHANGE UP (ref 1.6–2.6)
MCHC RBC-ENTMCNC: 30.5 PG — SIGNIFICANT CHANGE UP (ref 27–34)
MCHC RBC-ENTMCNC: 33 % — SIGNIFICANT CHANGE UP (ref 32–36)
MCV RBC AUTO: 92.5 FL — SIGNIFICANT CHANGE UP (ref 80–100)
NRBC # FLD: 0 K/UL — SIGNIFICANT CHANGE UP (ref 0–0)
PHOSPHATE SERPL-MCNC: 2.7 MG/DL — SIGNIFICANT CHANGE UP (ref 2.5–4.5)
PLATELET # BLD AUTO: 191 K/UL — SIGNIFICANT CHANGE UP (ref 150–400)
PMV BLD: 9.5 FL — SIGNIFICANT CHANGE UP (ref 7–13)
POTASSIUM SERPL-MCNC: 3.6 MMOL/L — SIGNIFICANT CHANGE UP (ref 3.5–5.3)
POTASSIUM SERPL-SCNC: 3.6 MMOL/L — SIGNIFICANT CHANGE UP (ref 3.5–5.3)
PROT SERPL-MCNC: 6.1 G/DL — SIGNIFICANT CHANGE UP (ref 6–8.3)
RBC # BLD: 4.91 M/UL — SIGNIFICANT CHANGE UP (ref 4.2–5.8)
RBC # FLD: 12.8 % — SIGNIFICANT CHANGE UP (ref 10.3–14.5)
SODIUM SERPL-SCNC: 137 MMOL/L — SIGNIFICANT CHANGE UP (ref 135–145)
WBC # BLD: 7.12 K/UL — SIGNIFICANT CHANGE UP (ref 3.8–10.5)
WBC # FLD AUTO: 7.12 K/UL — SIGNIFICANT CHANGE UP (ref 3.8–10.5)

## 2019-12-29 PROCEDURE — 99233 SBSQ HOSP IP/OBS HIGH 50: CPT

## 2019-12-29 RX ORDER — SODIUM CHLORIDE 9 MG/ML
1000 INJECTION, SOLUTION INTRAVENOUS
Refills: 0 | Status: DISCONTINUED | OUTPATIENT
Start: 2019-12-29 | End: 2019-12-30

## 2019-12-29 RX ORDER — GLUCAGON INJECTION, SOLUTION 0.5 MG/.1ML
1 INJECTION, SOLUTION SUBCUTANEOUS ONCE
Refills: 0 | Status: DISCONTINUED | OUTPATIENT
Start: 2019-12-29 | End: 2019-12-30

## 2019-12-29 RX ORDER — INSULIN GLARGINE 100 [IU]/ML
7 INJECTION, SOLUTION SUBCUTANEOUS AT BEDTIME
Refills: 0 | Status: DISCONTINUED | OUTPATIENT
Start: 2019-12-29 | End: 2019-12-30

## 2019-12-29 RX ORDER — DEXTROSE 50 % IN WATER 50 %
25 SYRINGE (ML) INTRAVENOUS ONCE
Refills: 0 | Status: DISCONTINUED | OUTPATIENT
Start: 2019-12-29 | End: 2019-12-30

## 2019-12-29 RX ORDER — INSULIN LISPRO 100/ML
VIAL (ML) SUBCUTANEOUS AT BEDTIME
Refills: 0 | Status: DISCONTINUED | OUTPATIENT
Start: 2019-12-29 | End: 2019-12-30

## 2019-12-29 RX ORDER — DEXTROSE 50 % IN WATER 50 %
12.5 SYRINGE (ML) INTRAVENOUS ONCE
Refills: 0 | Status: DISCONTINUED | OUTPATIENT
Start: 2019-12-29 | End: 2019-12-30

## 2019-12-29 RX ORDER — THIAMINE MONONITRATE (VIT B1) 100 MG
100 TABLET ORAL DAILY
Refills: 0 | Status: DISCONTINUED | OUTPATIENT
Start: 2019-12-29 | End: 2019-12-30

## 2019-12-29 RX ORDER — INSULIN LISPRO 100/ML
VIAL (ML) SUBCUTANEOUS
Refills: 0 | Status: DISCONTINUED | OUTPATIENT
Start: 2019-12-29 | End: 2019-12-30

## 2019-12-29 RX ORDER — DEXTROSE 50 % IN WATER 50 %
15 SYRINGE (ML) INTRAVENOUS ONCE
Refills: 0 | Status: DISCONTINUED | OUTPATIENT
Start: 2019-12-29 | End: 2019-12-30

## 2019-12-29 RX ORDER — INSULIN LISPRO 100/ML
4 VIAL (ML) SUBCUTANEOUS
Refills: 0 | Status: DISCONTINUED | OUTPATIENT
Start: 2019-12-29 | End: 2019-12-30

## 2019-12-29 RX ORDER — FOLIC ACID 0.8 MG
1 TABLET ORAL DAILY
Refills: 0 | Status: DISCONTINUED | OUTPATIENT
Start: 2019-12-29 | End: 2019-12-30

## 2019-12-29 RX ADMIN — Medication 2: at 12:38

## 2019-12-29 RX ADMIN — Medication 4 UNIT(S): at 12:38

## 2019-12-29 RX ADMIN — Medication 100 MILLIGRAM(S): at 12:09

## 2019-12-29 RX ADMIN — Medication 4 UNIT(S): at 17:44

## 2019-12-29 RX ADMIN — INSULIN GLARGINE 7 UNIT(S): 100 INJECTION, SOLUTION SUBCUTANEOUS at 22:24

## 2019-12-29 RX ADMIN — Medication 1 TABLET(S): at 12:10

## 2019-12-29 RX ADMIN — Medication 1: at 17:44

## 2019-12-29 RX ADMIN — Medication 1 MILLIGRAM(S): at 12:09

## 2019-12-29 NOTE — CONSULT NOTE ADULT - PROBLEM SELECTOR RECOMMENDATION 9
Will start on Lantus 7 units at bed time.  Will start Humalog 4 units before each meal in addition to Humalog correction scale coverage.  Patient counseled for compliance with consistent low carb diet, exercise as tolerated as out patient.

## 2019-12-29 NOTE — CONSULT NOTE ADULT - ASSESSMENT
Assessment  DMT2: 59y Male with DM T2 with hyperglycemia, A1C 11.1, now on insulin coverage, blood sugars not at target, no hypoglycemic episode, eating partial meals, compliant with low carb diet.  ETOH induced pancreatitis: on medications, improving, stable, monitored.        Fauzia Kim MD  Cell: [phone]  Office: [phone]

## 2019-12-29 NOTE — CONSULT NOTE ADULT - SUBJECTIVE AND OBJECTIVE BOX
HPI:  Patient is a 60 y/o M PMH ETOH comb by chronic pancreatitis, PE 2/16, splenic vein thrombosis s/p coumadin p/w nausea, nonbloody vomiting, epigastric burning w/ radiation to throat since this AM. Patient reports previously sober X1 year, started drinking beer and vodka daily 3 days ago. Had guests over for the holidays, alcohol was available and he started drinking. Reports 2-3 beers and 1-2 shots of vodka daily. Last PO intake approximately 1800 yesterday evening. Attempted to eat jello and drink in the ED, triggered moderate abd pain and nausea. (27 Dec 2019 21:16)  Patient has no history of diabetes, was never treated for diabetes, he has not seen a doctor for long time, no recent hypoglycemic episodes, no polyuria polydipsia.    PAST MEDICAL & SURGICAL HISTORY:  Pulmonary embolism  Splenic vein thrombosis  Pseudocyst, pancreas  Pancreatitis  Abuse, Drug or Alcohol  S/P laparoscopic procedure: Lap cystgastrostomy in 2012      FAMILY HISTORY:  Family history of asthma (Sibling)      Social History:    Outpatient Medications:    MEDICATIONS  (STANDING):  dextrose 5%. 1000 milliLiter(s) (50 mL/Hr) IV Continuous <Continuous>  dextrose 50% Injectable 12.5 Gram(s) IV Push once  dextrose 50% Injectable 25 Gram(s) IV Push once  dextrose 50% Injectable 25 Gram(s) IV Push once  folic acid 1 milliGRAM(s) Oral daily  influenza   Vaccine 0.5 milliLiter(s) IntraMuscular once  insulin glargine Injectable (LANTUS) 7 Unit(s) SubCutaneous at bedtime  insulin lispro (HumaLOG) corrective regimen sliding scale   SubCutaneous three times a day before meals  insulin lispro (HumaLOG) corrective regimen sliding scale   SubCutaneous at bedtime  insulin lispro Injectable (HumaLOG) 4 Unit(s) SubCutaneous three times a day before meals  multivitamin 1 Tablet(s) Oral daily  sodium chloride 0.9%. 1000 milliLiter(s) (150 mL/Hr) IV Continuous <Continuous>  thiamine 100 milliGRAM(s) Oral daily    MEDICATIONS  (PRN):  dextrose 40% Gel 15 Gram(s) Oral once PRN Blood Glucose LESS THAN 70 milliGRAM(s)/deciliter  glucagon  Injectable 1 milliGRAM(s) IntraMuscular once PRN Glucose LESS THAN 70 milligrams/deciliter  LORazepam   Injectable 2 milliGRAM(s) IV Push every 1 hour PRN Symptom-triggered: each CIWA -Ar score 8 or GREATER  metoclopramide Injectable 10 milliGRAM(s) IV Push every 8 hours PRN nausea/vomiting      Allergies    Bananas (Rash)  morphine (Urticaria; Rash; Hives)    Intolerances      Review of Systems:  Constitutional: No fever, no chills  Eyes: No blurry vision  Neuro: No tremors  HEENT: No pain, no neck swelling  Cardiovascular: No chest pain, no palpitations  Respiratory: No SOB, no cough  GI: No nausea, vomiting, abdominal pain  : No dysuria  Skin: no rash  MSK: Has leg swelling.  Psych: no depression  Endocrine: no polyuria, polydipsia    UNABLE TO OBTAIN    ALL OTHER SYSTEMS REVIEWED AND NEGATIVE        PHYSICAL EXAM:  VITALS: T(C): 36.8 (12-29-19 @ 17:04)  T(F): 98.2 (12-29-19 @ 17:04), Max: 98.4 (12-29-19 @ 01:16)  HR: 88 (12-29-19 @ 17:04) (80 - 90)  BP: 136/82 (12-29-19 @ 17:04) (129/80 - 149/89)  RR:  (14 - 17)  SpO2:  (95% - 100%)  Wt(kg): --  GENERAL: NAD, well-groomed, well-developed  EYES: No proptosis, no lid lag  HEENT:  Atraumatic, Normocephalic  THYROID: Normal size, no palpable nodules  RESPIRATORY: Clear to auscultation bilaterally; No rales, rhonchi, wheezing  CARDIOVASCULAR: Si S2, No murmurs;  GI: Soft, non distended, normal bowel sounds  SKIN: Dry, intact, No rashes or lesions  MUSCULOSKELETAL: Has BL lower extremity edema.  NEURO:  no tremor, sensation decreased in feet BL,    POCT Blood Glucose.: 197 mg/dL (12-29-19 @ 17:29)  POCT Blood Glucose.: 230 mg/dL (12-29-19 @ 12:18)                            15.0   7.12  )-----------( 191      ( 29 Dec 2019 07:15 )             45.4       12-29    137  |  100  |  5<L>  ----------------------------<  204<H>  3.6   |  23  |  0.83    EGFR if : 112  EGFR if non : 97    Ca    8.6      12-29  Mg     1.8     12-29  Phos  2.7     12-29    TPro  6.1  /  Alb  3.5  /  TBili  1.5<H>  /  DBili  0.3<H>  /  AST  65<H>  /  ALT  63<H>  /  AlkPhos  69  12-29      Thyroid Function Tests:      Hemoglobin A1C, Whole Blood: 11.1 % <H> [4.0 - 5.6] (12-29-19 @ 07:15)          Radiology:

## 2019-12-29 NOTE — PROGRESS NOTE ADULT - SUBJECTIVE AND OBJECTIVE BOX
Patient is a 59y old  Male who presents with a chief complaint of abd pain (28 Dec 2019 15:54)      SUBJECTIVE / OVERNIGHT EVENTS:  CIWA score 0  MEDICATIONS  (STANDING):  dextrose 5%. 1000 milliLiter(s) (50 mL/Hr) IV Continuous <Continuous>  dextrose 50% Injectable 12.5 Gram(s) IV Push once  dextrose 50% Injectable 25 Gram(s) IV Push once  dextrose 50% Injectable 25 Gram(s) IV Push once  folic acid 1 milliGRAM(s) Oral daily  influenza   Vaccine 0.5 milliLiter(s) IntraMuscular once  insulin lispro (HumaLOG) corrective regimen sliding scale   SubCutaneous three times a day before meals  insulin lispro (HumaLOG) corrective regimen sliding scale   SubCutaneous at bedtime  multivitamin 1 Tablet(s) Oral daily  sodium chloride 0.9%. 1000 milliLiter(s) (150 mL/Hr) IV Continuous <Continuous>  thiamine 100 milliGRAM(s) Oral daily    MEDICATIONS  (PRN):  dextrose 40% Gel 15 Gram(s) Oral once PRN Blood Glucose LESS THAN 70 milliGRAM(s)/deciliter  glucagon  Injectable 1 milliGRAM(s) IntraMuscular once PRN Glucose LESS THAN 70 milligrams/deciliter  LORazepam   Injectable 2 milliGRAM(s) IV Push every 1 hour PRN Symptom-triggered: each CIWA -Ar score 8 or GREATER  metoclopramide Injectable 10 milliGRAM(s) IV Push every 8 hours PRN nausea/vomiting      Vital Signs Last 24 Hrs  T(C): 36.7 (29 Dec 2019 05:14), Max: 36.9 (29 Dec 2019 01:16)  T(F): 98 (29 Dec 2019 05:14), Max: 98.4 (29 Dec 2019 01:16)  HR: 80 (29 Dec 2019 05:14) (80 - 91)  BP: 146/78 (29 Dec 2019 05:14) (135/83 - 149/89)  BP(mean): --  RR: 17 (29 Dec 2019 05:14) (16 - 17)  SpO2: 100% (29 Dec 2019 05:14) (97% - 100%)  CAPILLARY BLOOD GLUCOSE        I&O's Summary      PHYSICAL EXAM:  GENERAL: NAD, well-developed  HEAD:  Atraumatic, Normocephalic  EYES: EOMI, PERRLA, conjunctiva and sclera clear  NECK: Supple, No JVD  CHEST/LUNG: Clear to auscultation bilaterally; No wheeze  HEART: Regular rate and rhythm; No murmurs, rubs, or gallops  ABDOMEN: Soft, Nontender, Nondistended; Bowel sounds present  EXTREMITIES:  2+ Peripheral Pulses, No clubbing, cyanosis, or edema  PSYCH: AAOx3  NEUROLOGY: non-focal  SKIN: No rashes or lesions    LABS:                        15.0   7.12  )-----------( 191      ( 29 Dec 2019 07:15 )             45.4     12-29    137  |  100  |  5<L>  ----------------------------<  204<H>  3.6   |  23  |  0.83    Ca    8.6      29 Dec 2019 07:15  Phos  2.7     12-29  Mg     1.8     12-29    TPro  6.1  /  Alb  3.5  /  TBili  1.5<H>  /  DBili  0.3<H>  /  AST  65<H>  /  ALT  63<H>  /  AlkPhos  69  12-29              RADIOLOGY & ADDITIONAL TESTS:    Imaging Personally Reviewed:    Consultant(s) Notes Reviewed:      Care Discussed with Consultants/Other Providers: Patient is a 59y old  Male who presents with a chief complaint of abd pain (28 Dec 2019 15:54)      SUBJECTIVE / OVERNIGHT EVENTS: patient seen and examined by bedside, abdominal pain , nausea and vomiting resolved, pt tolerated regular diet today   pt diagnosed with new onset DM, Hb A1c 11.1, wants to stay today for diabetic teaching   CIWA score 0  MEDICATIONS  (STANDING):  dextrose 5%. 1000 milliLiter(s) (50 mL/Hr) IV Continuous <Continuous>  dextrose 50% Injectable 12.5 Gram(s) IV Push once  dextrose 50% Injectable 25 Gram(s) IV Push once  dextrose 50% Injectable 25 Gram(s) IV Push once  folic acid 1 milliGRAM(s) Oral daily  influenza   Vaccine 0.5 milliLiter(s) IntraMuscular once  insulin lispro (HumaLOG) corrective regimen sliding scale   SubCutaneous three times a day before meals  insulin lispro (HumaLOG) corrective regimen sliding scale   SubCutaneous at bedtime  multivitamin 1 Tablet(s) Oral daily  sodium chloride 0.9%. 1000 milliLiter(s) (150 mL/Hr) IV Continuous <Continuous>  thiamine 100 milliGRAM(s) Oral daily    MEDICATIONS  (PRN):  dextrose 40% Gel 15 Gram(s) Oral once PRN Blood Glucose LESS THAN 70 milliGRAM(s)/deciliter  glucagon  Injectable 1 milliGRAM(s) IntraMuscular once PRN Glucose LESS THAN 70 milligrams/deciliter  LORazepam   Injectable 2 milliGRAM(s) IV Push every 1 hour PRN Symptom-triggered: each CIWA -Ar score 8 or GREATER  metoclopramide Injectable 10 milliGRAM(s) IV Push every 8 hours PRN nausea/vomiting      Vital Signs Last 24 Hrs  T(C): 36.7 (29 Dec 2019 05:14), Max: 36.9 (29 Dec 2019 01:16)  T(F): 98 (29 Dec 2019 05:14), Max: 98.4 (29 Dec 2019 01:16)  HR: 80 (29 Dec 2019 05:14) (80 - 91)  BP: 146/78 (29 Dec 2019 05:14) (135/83 - 149/89)  BP(mean): --  RR: 17 (29 Dec 2019 05:14) (16 - 17)  SpO2: 100% (29 Dec 2019 05:14) (97% - 100%)  CAPILLARY BLOOD GLUCOSE        PHYSICAL EXAM:  GENERAL: NAD, well-developed  HEAD:  Atraumatic, Normocephalic  EYES: EOMI, PERRLA, conjunctiva and sclera clear  NECK: Supple,   CHEST/LUNG: Clear to auscultation bilaterally; No wheeze  HEART: Regular rate and rhythm;   ABDOMEN: Soft, Nontender, Nondistended; Bowel sounds present  EXTREMITIES:  2+ Peripheral Pulses, No clubbing, cyanosis, or edema  PSYCH: AAOx3  NEUROLOGY: non-focal, no tremors   SKIN: No rashes or lesions    LABS:                        15.0   7.12  )-----------( 191      ( 29 Dec 2019 07:15 )             45.4     12-29    137  |  100  |  5<L>  ----------------------------<  204<H>  3.6   |  23  |  0.83    Ca    8.6      29 Dec 2019 07:15  Phos  2.7     12-29  Mg     1.8     12-29    TPro  6.1  /  Alb  3.5  /  TBili  1.5<H>  /  DBili  0.3<H>  /  AST  65<H>  /  ALT  63<H>  /  AlkPhos  69  12-29              RADIOLOGY & ADDITIONAL TESTS:    Imaging Personally Reviewed:    Consultant(s) Notes Reviewed:      Care Discussed with Consultants/Other Providers:

## 2019-12-29 NOTE — PROGRESS NOTE ADULT - PROBLEM SELECTOR PLAN 1
may be 2/2 chronic pancreatitis, exacerbated by acute ETOH use, vs gastritis, will  IVF, symptom control   tolerating clear liquid diet, will advance as tolerated may be 2/2 chronic pancreatitis, exacerbated by acute ETOH use, vs gastritis, improved now , no n/v today    tolerating  regular diet

## 2019-12-29 NOTE — PROGRESS NOTE ADULT - PROBLEM SELECTOR PLAN 3
1.  Name of PCP: no PCP  2.  PCP Contacted on Admission: [ ] Y    [X] N    3.  PCP contacted at Discharge: [ ] Y    [ ] N    [ ] N/A  4.  Post-Discharge Appointment Date and Location:  5.  Summary of Handoff given to PCP: pt with Hb A1c of 11.1, no Hx of DM  will start on basal and bolus insulin  carbohydrate consistent diet   diabetic teaching  Endo eval ( Dr Kim)   check lipid profile

## 2019-12-30 ENCOUNTER — TRANSCRIPTION ENCOUNTER (OUTPATIENT)
Age: 59
End: 2019-12-30

## 2019-12-30 VITALS
HEART RATE: 98 BPM | SYSTOLIC BLOOD PRESSURE: 123 MMHG | DIASTOLIC BLOOD PRESSURE: 83 MMHG | OXYGEN SATURATION: 97 % | TEMPERATURE: 99 F | RESPIRATION RATE: 17 BRPM

## 2019-12-30 LAB
CHOLEST SERPL-MCNC: 221 MG/DL — HIGH (ref 120–199)
GLUCOSE BLDC GLUCOMTR-MCNC: 106 MG/DL — HIGH (ref 70–99)
HDLC SERPL-MCNC: 82 MG/DL — HIGH (ref 35–55)
LIPID PNL WITH DIRECT LDL SERPL: 136 MG/DL — SIGNIFICANT CHANGE UP
TRIGL SERPL-MCNC: 64 MG/DL — SIGNIFICANT CHANGE UP (ref 10–149)

## 2019-12-30 PROCEDURE — 99239 HOSP IP/OBS DSCHRG MGMT >30: CPT

## 2019-12-30 RX ORDER — FOLIC ACID 0.8 MG
1 TABLET ORAL
Qty: 0 | Refills: 0 | DISCHARGE
Start: 2019-12-30

## 2019-12-30 RX ORDER — METFORMIN HYDROCHLORIDE 850 MG/1
2 TABLET ORAL
Qty: 120 | Refills: 0
Start: 2019-12-30 | End: 2020-01-28

## 2019-12-30 RX ORDER — THIAMINE MONONITRATE (VIT B1) 100 MG
1 TABLET ORAL
Qty: 0 | Refills: 0 | DISCHARGE
Start: 2019-12-30

## 2019-12-30 RX ADMIN — Medication 4 UNIT(S): at 12:31

## 2019-12-30 RX ADMIN — Medication 1 TABLET(S): at 12:29

## 2019-12-30 RX ADMIN — Medication 100 MILLIGRAM(S): at 12:29

## 2019-12-30 RX ADMIN — Medication 4 UNIT(S): at 09:22

## 2019-12-30 RX ADMIN — Medication 1 MILLIGRAM(S): at 12:29

## 2019-12-30 RX ADMIN — SODIUM CHLORIDE 150 MILLILITER(S): 9 INJECTION INTRAMUSCULAR; INTRAVENOUS; SUBCUTANEOUS at 09:22

## 2019-12-30 NOTE — PROGRESS NOTE ADULT - ASSESSMENT
Assessment  DMT2: 59y Male with DM T2 with hyperglycemia, A1C 11.1, now on insulin coverage, blood sugars not at target, no hypoglycemic episode, eating partial meals, compliant with low carb diet.  ETOH induced pancreatitis: on medications, improving, stable, monitored.        Fauzia Kim MD  Cell: [phone]  Office: [phone]
Patient is a 60 y/o M PMH ETOH comb by chronic pancreatitis, PE 2/16, splenic vein thrombosis s/p coumadin p/w nausea, nonbloody vomiting, epigastric burning w/ radiation to throat
Patient is a 60 y/o M PMH ETOH comb by chronic pancreatitis, PE 2/16, splenic vein thrombosis s/p coumadin p/w nausea, nonbloody vomiting, epigastric burning w/ radiation to throat
Patient is a 60 y/o M PMH ETOH comb by chronic pancreatitis, PE 2/16, splenic vein thrombosis s/p coumadin p/w nausea, nonbloody vomiting, epigastric burning admitted for acute exacerbation of chronic pancreatitis and newly diagnosed with Type 2 DM.

## 2019-12-30 NOTE — PROGRESS NOTE ADULT - PROBLEM SELECTOR PLAN 1
Will continue current insulin regimen for now. Will continue monitoring FS, log, will Follow up.  Since patient has ETOH issues insulin injection is not the best option for him, can be DC home on Metformin 1000mg PO BID, needs FU in 4 weeks.  Patient counseled for compliance with consistent low carb diet, exercise as tolerated as out patient.

## 2019-12-30 NOTE — PROGRESS NOTE ADULT - PROBLEM SELECTOR PLAN 3
pt with Hb A1c of 11.1, no Hx of DM. Evaluated by endocrinology, recs reviewed and appreciated  carbohydrate consistent diet   diabetic teaching  Metformin 1000mg PO BID for now, FU in 4 weeks with endo

## 2019-12-30 NOTE — PROGRESS NOTE ADULT - PROBLEM SELECTOR PLAN 1
may be 2/2 chronic pancreatitis, exacerbated by acute ETOH use, vs gastritis, improved   tolerating  regular diet

## 2019-12-30 NOTE — PROGRESS NOTE ADULT - SUBJECTIVE AND OBJECTIVE BOX
Chief complaint  Patient is a 59y old  Male who presents with a chief complaint of Abdominal pain due to acute exacerbation of chronic pancreatitis; Newly diagnosed Type 2 diabetes (30 Dec 2019 13:55)   Review of systems  Patient in bed, looks comfortable, no fever,  no hypoglycemia.    Labs and Fingersticks  CAPILLARY BLOOD GLUCOSE      POCT Blood Glucose.: 106 mg/dL (30 Dec 2019 12:23)  POCT Blood Glucose.: 139 mg/dL (30 Dec 2019 08:35)  POCT Blood Glucose.: 146 mg/dL (29 Dec 2019 22:01)  POCT Blood Glucose.: 197 mg/dL (29 Dec 2019 17:29)      Anion Gap, Serum: 14 (12-29 @ 07:15)    Hemoglobin A1C, Whole Blood: 11.1 <H> (12-29 @ 07:15)    Calcium, Total Serum: 8.6 (12-29 @ 07:15)  Albumin, Serum: 3.5 (12-29 @ 07:15)    Alanine Aminotransferase (ALT/SGPT): 63 <H> (12-29 @ 07:15)  Alkaline Phosphatase, Serum: 69 (12-29 @ 07:15)  Aspartate Aminotransferase (AST/SGOT): 65 <H> (12-29 @ 07:15)        12-29    137  |  100  |  5<L>  ----------------------------<  204<H>  3.6   |  23  |  0.83    Ca    8.6      29 Dec 2019 07:15  Phos  2.7     12-29  Mg     1.8     12-29    TPro  6.1  /  Alb  3.5  /  TBili  1.5<H>  /  DBili  0.3<H>  /  AST  65<H>  /  ALT  63<H>  /  AlkPhos  69  12-29                        15.0   7.12  )-----------( 191      ( 29 Dec 2019 07:15 )             45.4     Medications  MEDICATIONS  (STANDING):  dextrose 5%. 1000 milliLiter(s) (50 mL/Hr) IV Continuous <Continuous>  dextrose 50% Injectable 12.5 Gram(s) IV Push once  dextrose 50% Injectable 25 Gram(s) IV Push once  dextrose 50% Injectable 25 Gram(s) IV Push once  folic acid 1 milliGRAM(s) Oral daily  influenza   Vaccine 0.5 milliLiter(s) IntraMuscular once  insulin glargine Injectable (LANTUS) 7 Unit(s) SubCutaneous at bedtime  insulin lispro (HumaLOG) corrective regimen sliding scale   SubCutaneous three times a day before meals  insulin lispro (HumaLOG) corrective regimen sliding scale   SubCutaneous at bedtime  insulin lispro Injectable (HumaLOG) 4 Unit(s) SubCutaneous three times a day before meals  multivitamin 1 Tablet(s) Oral daily  sodium chloride 0.9%. 1000 milliLiter(s) (150 mL/Hr) IV Continuous <Continuous>  thiamine 100 milliGRAM(s) Oral daily      Physical Exam  General: Patient comfortable in bed  Vital Signs Last 12 Hrs  T(F): 98.8 (12-30-19 @ 13:12), Max: 98.8 (12-30-19 @ 13:12)  HR: 98 (12-30-19 @ 13:12) (88 - 98)  BP: 123/83 (12-30-19 @ 13:12) (123/83 - 141/83)  BP(mean): --  RR: 17 (12-30-19 @ 13:12) (17 - 17)  SpO2: 97% (12-30-19 @ 13:12) (97% - 99%)  Neck: No palpable thyroid nodules.  CVS: S1S2, No murmurs  Respiratory: No wheezing, no crepitations  GI: Abdomen soft, bowel sounds positive  Musculoskeletal:  edema lower extremities.   Skin: No skin rashes, no ecchymosis    Diagnostics

## 2019-12-30 NOTE — PROGRESS NOTE ADULT - REASON FOR ADMISSION
Abdominal pain due to acute exacerbation of chronic pancreatitis; Newly diagnosed Type 2 diabetes
abd pain

## 2019-12-30 NOTE — DISCHARGE NOTE NURSING/CASE MANAGEMENT/SOCIAL WORK - PATIENT PORTAL LINK FT
You can access the FollowMyHealth Patient Portal offered by [facility] by registering at the following website: [email or web address]. By joining [facility]’s FollowMyHealth portal, you will also be able to view your health information using other applications (apps) compatible with our system.

## 2019-12-30 NOTE — PROGRESS NOTE ADULT - PROBLEM SELECTOR PLAN 4
Patient medically stable to be discharged home today. Spent 37 min coordinating discharge plan    1.  Name of PCP: no PCP  2.  PCP Contacted on Admission: [ ] Y    [X] N    3.  PCP contacted at Discharge: [ ] Y    [ ] N    [ ] N/A  4.  Post-Discharge Appointment Date and Location:  5.  Summary of Handoff given to PCP:
1.  Name of PCP: no PCP  2.  PCP Contacted on Admission: [ ] Y    [X] N    3.  PCP contacted at Discharge: [ ] Y    [ ] N    [ ] N/A  4.  Post-Discharge Appointment Date and Location:  5.  Summary of Handoff given to PCP:

## 2019-12-30 NOTE — PROGRESS NOTE ADULT - PROBLEM SELECTOR PROBLEM 2
Alcohol abuse
Alcohol-induced acute pancreatitis without infection or necrosis

## 2019-12-30 NOTE — PROGRESS NOTE ADULT - PROBLEM SELECTOR PLAN 2
low risk CIWA, did not require any PRNs at this time,   - recommend EtOH cessation
Suggest to continue medications, monitoring, FU primary team recommendations. .
low risk CIWA, did not require any PRNs at this time, if does not require any PRNs by tomorrow , can likely discontinue
low risk CIWA, did not require any PRNs at this time, if does not require any PRNs by tomorrow , can likely discontinue

## 2019-12-30 NOTE — PROGRESS NOTE ADULT - SUBJECTIVE AND OBJECTIVE BOX
Patient is a 59y old  Male who presents with a chief complaint of abd pain (29 Dec 2019 17:34)        SUBJECTIVE / OVERNIGHT EVENTS:      MEDICATIONS  (STANDING):  dextrose 5%. 1000 milliLiter(s) (50 mL/Hr) IV Continuous <Continuous>  dextrose 50% Injectable 12.5 Gram(s) IV Push once  dextrose 50% Injectable 25 Gram(s) IV Push once  dextrose 50% Injectable 25 Gram(s) IV Push once  folic acid 1 milliGRAM(s) Oral daily  influenza   Vaccine 0.5 milliLiter(s) IntraMuscular once  insulin glargine Injectable (LANTUS) 7 Unit(s) SubCutaneous at bedtime  insulin lispro (HumaLOG) corrective regimen sliding scale   SubCutaneous three times a day before meals  insulin lispro (HumaLOG) corrective regimen sliding scale   SubCutaneous at bedtime  insulin lispro Injectable (HumaLOG) 4 Unit(s) SubCutaneous three times a day before meals  multivitamin 1 Tablet(s) Oral daily  sodium chloride 0.9%. 1000 milliLiter(s) (150 mL/Hr) IV Continuous <Continuous>  thiamine 100 milliGRAM(s) Oral daily    MEDICATIONS  (PRN):  dextrose 40% Gel 15 Gram(s) Oral once PRN Blood Glucose LESS THAN 70 milliGRAM(s)/deciliter  glucagon  Injectable 1 milliGRAM(s) IntraMuscular once PRN Glucose LESS THAN 70 milligrams/deciliter  LORazepam   Injectable 2 milliGRAM(s) IV Push every 1 hour PRN Symptom-triggered: each CIWA -Ar score 8 or GREATER  metoclopramide Injectable 10 milliGRAM(s) IV Push every 8 hours PRN nausea/vomiting      Vital Signs Last 24 Hrs  T(C): 37.1 (30 Dec 2019 13:12), Max: 37.1 (29 Dec 2019 21:43)  T(F): 98.8 (30 Dec 2019 13:12), Max: 98.8 (29 Dec 2019 21:43)  HR: 98 (30 Dec 2019 13:12) (88 - 98)  BP: 123/83 (30 Dec 2019 13:12) (123/83 - 141/83)  BP(mean): --  RR: 17 (30 Dec 2019 13:12) (14 - 17)  SpO2: 97% (30 Dec 2019 13:12) (95% - 100%)  CAPILLARY BLOOD GLUCOSE      POCT Blood Glucose.: 106 mg/dL (30 Dec 2019 12:23)  POCT Blood Glucose.: 139 mg/dL (30 Dec 2019 08:35)  POCT Blood Glucose.: 146 mg/dL (29 Dec 2019 22:01)  POCT Blood Glucose.: 197 mg/dL (29 Dec 2019 17:29)    I&O's Summary        PHYSICAL EXAM  GENERAL: NAD, well-developed  HEAD:  Atraumatic, Normocephalic  EYES: EOMI, PERRLA, conjunctiva and sclera clear  NECK: Supple, No JVD  CHEST/LUNG: Clear to auscultation bilaterally; No wheeze  HEART: Regular rate and rhythm; No murmurs, rubs, or gallops  ABDOMEN: Soft, Nontender, Nondistended; Bowel sounds present  EXTREMITIES:  2+ Peripheral Pulses, No clubbing, cyanosis, or edema  PSYCH: AAOx3  SKIN: No rashes or lesions    LABS:                        15.0   7.12  )-----------( 191      ( 29 Dec 2019 07:15 )             45.4     12-29    137  |  100  |  5<L>  ----------------------------<  204<H>  3.6   |  23  |  0.83    Ca    8.6      29 Dec 2019 07:15  Phos  2.7     12-29  Mg     1.8     12-29    TPro  6.1  /  Alb  3.5  /  TBili  1.5<H>  /  DBili  0.3<H>  /  AST  65<H>  /  ALT  63<H>  /  AlkPhos  69  12-29              RADIOLOGY & ADDITIONAL TESTS:    Imaging Personally Reviewed:  Consultant(s) Notes Reviewed:    Care Discussed with Consultants/Other Providers: Patient is a 59y old  Male who presents with a chief complaint of abd pain (29 Dec 2019 17:34)    SUBJECTIVE / OVERNIGHT EVENTS:      MEDICATIONS  (STANDING):  dextrose 5%. 1000 milliLiter(s) (50 mL/Hr) IV Continuous <Continuous>  dextrose 50% Injectable 12.5 Gram(s) IV Push once  dextrose 50% Injectable 25 Gram(s) IV Push once  dextrose 50% Injectable 25 Gram(s) IV Push once  folic acid 1 milliGRAM(s) Oral daily  influenza   Vaccine 0.5 milliLiter(s) IntraMuscular once  insulin glargine Injectable (LANTUS) 7 Unit(s) SubCutaneous at bedtime  insulin lispro (HumaLOG) corrective regimen sliding scale   SubCutaneous three times a day before meals  insulin lispro (HumaLOG) corrective regimen sliding scale   SubCutaneous at bedtime  insulin lispro Injectable (HumaLOG) 4 Unit(s) SubCutaneous three times a day before meals  multivitamin 1 Tablet(s) Oral daily  sodium chloride 0.9%. 1000 milliLiter(s) (150 mL/Hr) IV Continuous <Continuous>  thiamine 100 milliGRAM(s) Oral daily    MEDICATIONS  (PRN):  dextrose 40% Gel 15 Gram(s) Oral once PRN Blood Glucose LESS THAN 70 milliGRAM(s)/deciliter  glucagon  Injectable 1 milliGRAM(s) IntraMuscular once PRN Glucose LESS THAN 70 milligrams/deciliter  LORazepam   Injectable 2 milliGRAM(s) IV Push every 1 hour PRN Symptom-triggered: each CIWA -Ar score 8 or GREATER  metoclopramide Injectable 10 milliGRAM(s) IV Push every 8 hours PRN nausea/vomiting      Vital Signs Last 24 Hrs  T(C): 37.1 (30 Dec 2019 13:12), Max: 37.1 (29 Dec 2019 21:43)  T(F): 98.8 (30 Dec 2019 13:12), Max: 98.8 (29 Dec 2019 21:43)  HR: 98 (30 Dec 2019 13:12) (88 - 98)  BP: 123/83 (30 Dec 2019 13:12) (123/83 - 141/83)  BP(mean): --  RR: 17 (30 Dec 2019 13:12) (14 - 17)  SpO2: 97% (30 Dec 2019 13:12) (95% - 100%)  CAPILLARY BLOOD GLUCOSE      POCT Blood Glucose.: 106 mg/dL (30 Dec 2019 12:23)  POCT Blood Glucose.: 139 mg/dL (30 Dec 2019 08:35)  POCT Blood Glucose.: 146 mg/dL (29 Dec 2019 22:01)  POCT Blood Glucose.: 197 mg/dL (29 Dec 2019 17:29)    I&O's Summary        PHYSICAL EXAM  GENERAL: NAD, well-developed  HEAD:  Atraumatic, Normocephalic  EYES: EOMI, PERRLA, conjunctiva and sclera clear  NECK: Supple, No JVD  CHEST/LUNG: Clear to auscultation bilaterally; No wheeze  HEART: Regular rate and rhythm; No murmurs, rubs, or gallops  ABDOMEN: Soft, Nontender, Nondistended; Bowel sounds present  EXTREMITIES:  2+ Peripheral Pulses, No clubbing, cyanosis, or edema  PSYCH: AAOx3  SKIN: No rashes or lesions    LABS:                        15.0   7.12  )-----------( 191      ( 29 Dec 2019 07:15 )             45.4     12-29    137  |  100  |  5<L>  ----------------------------<  204<H>  3.6   |  23  |  0.83    Ca    8.6      29 Dec 2019 07:15  Phos  2.7     12-29  Mg     1.8     12-29    TPro  6.1  /  Alb  3.5  /  TBili  1.5<H>  /  DBili  0.3<H>  /  AST  65<H>  /  ALT  63<H>  /  AlkPhos  69  12-29              RADIOLOGY & ADDITIONAL TESTS:    Imaging Personally Reviewed:  Consultant(s) Notes Reviewed:    Care Discussed with Consultants/Other Providers: Patient is a 59y old  Male who presents with a chief complaint of abd pain (29 Dec 2019 17:34)    SUBJECTIVE / OVERNIGHT EVENTS:  Patient seen and examined in the early afternoon. Patient felt well. No acute issues. No n/v or abdominal pain.     MEDICATIONS  (STANDING):  dextrose 5%. 1000 milliLiter(s) (50 mL/Hr) IV Continuous <Continuous>  dextrose 50% Injectable 12.5 Gram(s) IV Push once  dextrose 50% Injectable 25 Gram(s) IV Push once  dextrose 50% Injectable 25 Gram(s) IV Push once  folic acid 1 milliGRAM(s) Oral daily  influenza   Vaccine 0.5 milliLiter(s) IntraMuscular once  insulin glargine Injectable (LANTUS) 7 Unit(s) SubCutaneous at bedtime  insulin lispro (HumaLOG) corrective regimen sliding scale   SubCutaneous three times a day before meals  insulin lispro (HumaLOG) corrective regimen sliding scale   SubCutaneous at bedtime  insulin lispro Injectable (HumaLOG) 4 Unit(s) SubCutaneous three times a day before meals  multivitamin 1 Tablet(s) Oral daily  sodium chloride 0.9%. 1000 milliLiter(s) (150 mL/Hr) IV Continuous <Continuous>  thiamine 100 milliGRAM(s) Oral daily    MEDICATIONS  (PRN):  dextrose 40% Gel 15 Gram(s) Oral once PRN Blood Glucose LESS THAN 70 milliGRAM(s)/deciliter  glucagon  Injectable 1 milliGRAM(s) IntraMuscular once PRN Glucose LESS THAN 70 milligrams/deciliter  LORazepam   Injectable 2 milliGRAM(s) IV Push every 1 hour PRN Symptom-triggered: each CIWA -Ar score 8 or GREATER  metoclopramide Injectable 10 milliGRAM(s) IV Push every 8 hours PRN nausea/vomiting      Vital Signs Last 24 Hrs  T(C): 37.1 (30 Dec 2019 13:12), Max: 37.1 (29 Dec 2019 21:43)  T(F): 98.8 (30 Dec 2019 13:12), Max: 98.8 (29 Dec 2019 21:43)  HR: 98 (30 Dec 2019 13:12) (88 - 98)  BP: 123/83 (30 Dec 2019 13:12) (123/83 - 141/83)  BP(mean): --  RR: 17 (30 Dec 2019 13:12) (14 - 17)  SpO2: 97% (30 Dec 2019 13:12) (95% - 100%)  CAPILLARY BLOOD GLUCOSE      POCT Blood Glucose.: 106 mg/dL (30 Dec 2019 12:23)  POCT Blood Glucose.: 139 mg/dL (30 Dec 2019 08:35)  POCT Blood Glucose.: 146 mg/dL (29 Dec 2019 22:01)  POCT Blood Glucose.: 197 mg/dL (29 Dec 2019 17:29)    I&O's Summary        PHYSICAL EXAM  GENERAL: NAD, well-developed  CHEST/LUNG: Clear to auscultation bilaterally; No wheeze  HEART: Regular rate and rhythm; No murmurs, rubs, or gallops  ABDOMEN: Soft, Nontender, Nondistended; Bowel sounds present  EXTREMITIES:  2+ Peripheral Pulses, No clubbing, cyanosis, or edema  PSYCH: AAOx3      Consultant(s) Notes Reviewed:  Yes  Care Discussed with Consultants/Other Providers:

## 2020-01-01 ENCOUNTER — OUTPATIENT (OUTPATIENT)
Dept: OUTPATIENT SERVICES | Facility: HOSPITAL | Age: 60
LOS: 1 days | End: 2020-01-01
Payer: MEDICAID

## 2020-01-01 DIAGNOSIS — Z98.89 OTHER SPECIFIED POSTPROCEDURAL STATES: Chronic | ICD-10-CM

## 2020-01-08 DIAGNOSIS — Z71.89 OTHER SPECIFIED COUNSELING: ICD-10-CM

## 2020-02-01 ENCOUNTER — OUTPATIENT (OUTPATIENT)
Dept: OUTPATIENT SERVICES | Facility: HOSPITAL | Age: 60
LOS: 1 days | End: 2020-02-01

## 2020-02-01 DIAGNOSIS — Z98.89 OTHER SPECIFIED POSTPROCEDURAL STATES: Chronic | ICD-10-CM

## 2020-02-19 DIAGNOSIS — Z71.89 OTHER SPECIFIED COUNSELING: ICD-10-CM

## 2020-03-18 NOTE — PROGRESS NOTE ADULT - PROBLEM/PLAN-3
- For snoring he can try sleeping more on side or raise the head end of the bed[can use wedge underneath his bed].  - for nasal congestion I have prescribed flonase[slow acting medication, should use at least for 2-3 weeks, before he starts noticing benefits] and also suggest increasing humidifier setting if he also has dry mouth.  Thanks    Lenny was seen today for sleep problem.    Diagnoses and all orders for this visit:    GIOVANI (obstructive sleep apnea)  -     SERVICE TO HOME CARE RESPIRATORY THERAPY    Nasal congestion  -     fluticasone (FLONASE) 50 MCG/ACT nasal spray; Spray 1 spray in each nostril daily.           DISPLAY PLAN FREE TEXT

## 2020-05-29 NOTE — H&P ADULT - LYMPH NODES
LVM that I was returning a call and they can call back or send a message on the portal.   detailed exam

## 2021-02-03 NOTE — ED PROVIDER NOTE - OBJECTIVE STATEMENT
You are scheduled for craniotomy with Jakub Pineda and Dr. Evan Goldman. Pre-op instructions discussed with patient and surgical packet provided:  · PCP clearance is needed. We have faxed a letter requesting medical clearance to their office.   Pl your surgery. ·                           · Our office will get prior authorization for your surgery. ·   · You will be in the Intensive Care Unit overnight, it is an average 3-5 days inpatient stay.   ·   · You can expect to have some facial swelling on PRESCRIPTIONS. • Written prescriptions must be picked up in office. • Please allow the office 2-3 business days to fill the prescription. • Patient must present photo ID at time of .   PLEASE NOTE: PRESCRIPTIONS MUST BE PICKED UP PRIOR TO 3:00PM to be completed by the patient prior to submitting forms to office staff. • Form completion may require an additional fee. • A signed Release of Information (KANA) must be on file before forms may be submitted.   When dropping off forms, please ask the r 58 yo man w/ h/o pancretitis and remote etoh abuse p/w abd pain. Upper abd, rad to back, started this am, constant, w/o clear aggravating/alleviating factors, similar to pancreatitis in past. Did not take anything for pain. Unsure why he has had flair. Had vomiting x2. Denies fever, diarrhea.

## 2021-05-26 NOTE — H&P ADULT - CONSTITUTIONAL
Quality 431: Preventive Care And Screening: Unhealthy Alcohol Use - Screening: Patient screened for unhealthy alcohol use using a single question and scores less than 2 times per year
Detail Level: Detailed
Quality 226: Preventive Care And Screening: Tobacco Use: Screening And Cessation Intervention: Patient screened for tobacco use and is an ex/non-smoker
detailed exam

## 2021-08-03 NOTE — DISCHARGE NOTE PROVIDER - NSDCFUADDINST_GEN_ALL_CORE_FT
Cytopathology testing  No other thoracic surgical interventions indicated   at this time  Continue care per primary team and Oncology.  Can continue to drain Pleurx cath 1-2 times per week  as needed for fluid accumulation  Recall with any new questions or concerns. follow up with your primary care doctor regarding- A new 0.8 cm cystic structure seen in the pancreatic head, which may represent a small pseudocyst found on CT scan

## 2023-05-05 NOTE — H&P ADULT. - GIT ABD PE PAL DETAILS PC
bilateral upper extremity ROM was WFL (within functional limits)/bilateral lower extremity ROM was WFL (within functional limits) tender

## 2023-09-18 NOTE — H&P ADULT. - ENMT
details… detailed exam mouth/pharynx/nose Rifampin Counseling: I discussed with the patient the risks of rifampin including but not limited to liver damage, kidney damage, red-orange body fluids, nausea/vomiting and severe allergy.

## 2024-02-01 NOTE — DIETITIAN INITIAL EVALUATION ADULT. - PERTINENT MEDS FT
PAST MEDICAL HISTORY:  CAD (coronary artery disease)     DM2 (diabetes mellitus, type 2)     HLD (hyperlipidemia)     HTN (hypertension)      Lipitor, Zofran

## 2024-08-20 NOTE — DISCHARGE NOTE ADULT - HOSPITAL COURSE
58 y/o M w/ hx chronic alcoholic pancreatitis, pseudocyst, splenic thrombosis, ?PE, HLD, p/w abd pain and n/v 2/2 acute on chronic pancreatitis in context of alcohol relapse 2/2 psychosocial stressors including his daughter's recent suicide attempt.     On admission, patient endorsed: severe 10/10 epigastric abdominal pain radiatiing to the back, not passing flatus, intolerance of all PO intake, and nausea/ vomiting.     ED course: patient was afebrile and had no leukocytosis. His vitals were within normal limits with the exception of some elevated blood pressures, which were likely elevated due to pain although could be his baseline. Patient's highest BP was 152/78. On admission, his creatinine was slightly elevated to 1.31. His lipase was found to be 95.6 and his amylase was found to be 184. Patient's lactate was also mildly elevated on VBG to 4.4.     Patient was hydrated with IVF and kept NPO. His pain was treated with IV dilaudid for severe pain and IV hydromorphone for moderate pain. His pain medication requirements decreased over the course of his hospitalization. Patient's diet was slowly advanced and he began to tolerate drinking fluids and eating. He was eating soft foods at discharge. Patient did not experience any nausea or vomiting while on the floor and reported his last episode of emesis was in the ER. On discharge, patient's vitals were stable and his Creatinine was normalized to 0.89. His lipase had dropped to 64.5 and his amylase was 108. Patient's serum lactate had normalized to 1.4    On discharge, patient was ambulating around the unit, tolerating a PO diet, and his pain had reached a tolerable level.     Discharge Physical Exam:  GENERAL: NAD, well-developed, adequate hygiene   HEAD:  Atraumatic, Normocephalic  EYES: conjunctiva and sclera clear  NECK: Supple, No JVD  CHEST/LUNG: Clear to auscultation bilaterally; No wheeze or crackles  HEART: Regular rate and rhythm; No murmurs, rubs, or gallops  ABDOMEN: Soft, soft, abdomen, mildly distended, mildly tender (tolerates moderate palpation); Bowel sounds present  EXTREMITIES:  2+ Peripheral Pulses, No clubbing, cyanosis, or edema  PSYCH: AAOx3, pleasant affect   SKIN: No rashes or lesions    Pt deemed stable for discharge. no 56 y/o M w/ hx chronic alcoholic pancreatitis, pseudocyst, splenic thrombosis, ?PE, HLD, p/w abd pain and n/v 2/2 acute on chronic pancreatitis in context of alcohol relapse 2/2 psychosocial stressors including his daughter's recent suicide attempt.     Patient was afebrile and had no leukocytosis. His vitals were within normal limits with the exception of some elevated blood pressures, which were likely elevated due to pain although could be his baseline. Patient's highest BP was 152/78. On admission, his creatinine was slightly elevated to 1.31. His lipase was found to be 95.6 and his amylase was found to be 184. Patient's lactate was also mildly elevated on VBG to 4.4. CIWA was 0-1, did not require any PRN ativan,.  Patient improved with pain control and was able to tolerate a regular diet prior to discharge. AYAN and lactic acid resolved with hydration.   On discharge, patient was ambulating around the unit, tolerating a PO diet, and his pain had reached a tolerable level.   Pt deemed stable for discharge.